# Patient Record
Sex: FEMALE | Race: ASIAN | NOT HISPANIC OR LATINO
[De-identification: names, ages, dates, MRNs, and addresses within clinical notes are randomized per-mention and may not be internally consistent; named-entity substitution may affect disease eponyms.]

---

## 2017-05-26 ENCOUNTER — TRANSCRIPTION ENCOUNTER (OUTPATIENT)
Age: 37
End: 2017-05-26

## 2017-05-26 ENCOUNTER — INPATIENT (INPATIENT)
Facility: HOSPITAL | Age: 37
LOS: 0 days | Discharge: HOME | DRG: 547 | End: 2017-05-26
Attending: INTERNAL MEDICINE | Admitting: INTERNAL MEDICINE
Payer: COMMERCIAL

## 2017-05-26 VITALS
DIASTOLIC BLOOD PRESSURE: 74 MMHG | HEART RATE: 70 BPM | RESPIRATION RATE: 18 BRPM | OXYGEN SATURATION: 100 % | TEMPERATURE: 208 F | SYSTOLIC BLOOD PRESSURE: 108 MMHG

## 2017-05-26 VITALS
RESPIRATION RATE: 16 BRPM | TEMPERATURE: 98 F | HEART RATE: 63 BPM | OXYGEN SATURATION: 99 % | DIASTOLIC BLOOD PRESSURE: 63 MMHG | SYSTOLIC BLOOD PRESSURE: 95 MMHG

## 2017-05-26 DIAGNOSIS — R11.2 NAUSEA WITH VOMITING, UNSPECIFIED: ICD-10-CM

## 2017-05-26 LAB
ALBUMIN SERPL ELPH-MCNC: 3.7 G/DL — SIGNIFICANT CHANGE UP (ref 3.3–5)
ALP SERPL-CCNC: 55 U/L — SIGNIFICANT CHANGE UP (ref 40–120)
ALT FLD-CCNC: 11 U/L RC — SIGNIFICANT CHANGE UP (ref 10–45)
ANION GAP SERPL CALC-SCNC: 15 MMOL/L — SIGNIFICANT CHANGE UP (ref 5–17)
APPEARANCE UR: CLEAR — SIGNIFICANT CHANGE UP
APTT BLD: 30.9 SEC — SIGNIFICANT CHANGE UP (ref 27.5–37.4)
AST SERPL-CCNC: 23 U/L — SIGNIFICANT CHANGE UP (ref 10–40)
BASOPHILS # BLD AUTO: 0 K/UL — SIGNIFICANT CHANGE UP (ref 0–0.2)
BASOPHILS NFR BLD AUTO: 0 % — SIGNIFICANT CHANGE UP (ref 0–2)
BILIRUB SERPL-MCNC: 0.3 MG/DL — SIGNIFICANT CHANGE UP (ref 0.2–1.2)
BILIRUB UR-MCNC: NEGATIVE — SIGNIFICANT CHANGE UP
BUN SERPL-MCNC: 9 MG/DL — SIGNIFICANT CHANGE UP (ref 7–23)
CALCIUM SERPL-MCNC: 8.3 MG/DL — LOW (ref 8.4–10.5)
CHLORIDE SERPL-SCNC: 107 MMOL/L — SIGNIFICANT CHANGE UP (ref 96–108)
CO2 SERPL-SCNC: 23 MMOL/L — SIGNIFICANT CHANGE UP (ref 22–31)
COLOR SPEC: YELLOW — SIGNIFICANT CHANGE UP
CREAT SERPL-MCNC: 0.62 MG/DL — SIGNIFICANT CHANGE UP (ref 0.5–1.3)
DIFF PNL FLD: NEGATIVE — SIGNIFICANT CHANGE UP
EOSINOPHIL # BLD AUTO: 0 K/UL — SIGNIFICANT CHANGE UP (ref 0–0.5)
EOSINOPHIL NFR BLD AUTO: 0.3 % — SIGNIFICANT CHANGE UP (ref 0–6)
EPI CELLS # UR: SIGNIFICANT CHANGE UP /HPF
GAS PNL BLDV: SIGNIFICANT CHANGE UP
GLUCOSE SERPL-MCNC: 88 MG/DL — SIGNIFICANT CHANGE UP (ref 70–99)
GLUCOSE UR QL: NEGATIVE — SIGNIFICANT CHANGE UP
HCT VFR BLD CALC: 33.7 % — LOW (ref 34.5–45)
HGB BLD-MCNC: 11.3 G/DL — LOW (ref 11.5–15.5)
INR BLD: 1.09 RATIO — SIGNIFICANT CHANGE UP (ref 0.88–1.16)
KETONES UR-MCNC: ABNORMAL
LEUKOCYTE ESTERASE UR-ACNC: NEGATIVE — SIGNIFICANT CHANGE UP
LIDOCAIN IGE QN: 41 U/L — SIGNIFICANT CHANGE UP (ref 7–60)
LYMPHOCYTES # BLD AUTO: 1 K/UL — SIGNIFICANT CHANGE UP (ref 1–3.3)
LYMPHOCYTES # BLD AUTO: 17.1 % — SIGNIFICANT CHANGE UP (ref 13–44)
MAGNESIUM SERPL-MCNC: 2 MG/DL — SIGNIFICANT CHANGE UP (ref 1.6–2.6)
MCHC RBC-ENTMCNC: 30.2 PG — SIGNIFICANT CHANGE UP (ref 27–34)
MCHC RBC-ENTMCNC: 33.6 GM/DL — SIGNIFICANT CHANGE UP (ref 32–36)
MCV RBC AUTO: 89.8 FL — SIGNIFICANT CHANGE UP (ref 80–100)
MONOCYTES # BLD AUTO: 0.2 K/UL — SIGNIFICANT CHANGE UP (ref 0–0.9)
MONOCYTES NFR BLD AUTO: 4 % — SIGNIFICANT CHANGE UP (ref 2–14)
NEUTROPHILS # BLD AUTO: 4.5 K/UL — SIGNIFICANT CHANGE UP (ref 1.8–7.4)
NEUTROPHILS NFR BLD AUTO: 78.7 % — HIGH (ref 43–77)
NITRITE UR-MCNC: NEGATIVE — SIGNIFICANT CHANGE UP
PH UR: 8.5 — HIGH (ref 5–8)
PHOSPHATE SERPL-MCNC: 1.5 MG/DL — LOW (ref 2.5–4.5)
PLATELET # BLD AUTO: 168 K/UL — SIGNIFICANT CHANGE UP (ref 150–400)
POTASSIUM SERPL-MCNC: 3.3 MMOL/L — LOW (ref 3.5–5.3)
POTASSIUM SERPL-SCNC: 3.3 MMOL/L — LOW (ref 3.5–5.3)
PROT SERPL-MCNC: 6.8 G/DL — SIGNIFICANT CHANGE UP (ref 6–8.3)
PROT UR-MCNC: >600 MG/DL
PROTHROM AB SERPL-ACNC: 11.8 SEC — SIGNIFICANT CHANGE UP (ref 9.8–12.7)
RBC # BLD: 3.76 M/UL — LOW (ref 3.8–5.2)
RBC # FLD: 11.1 % — SIGNIFICANT CHANGE UP (ref 10.3–14.5)
RBC CASTS # UR COMP ASSIST: SIGNIFICANT CHANGE UP /HPF (ref 0–2)
SODIUM SERPL-SCNC: 145 MMOL/L — SIGNIFICANT CHANGE UP (ref 135–145)
SP GR SPEC: 1.02 — SIGNIFICANT CHANGE UP (ref 1.01–1.02)
UROBILINOGEN FLD QL: NEGATIVE — SIGNIFICANT CHANGE UP
WBC # BLD: 5.7 K/UL — SIGNIFICANT CHANGE UP (ref 3.8–10.5)
WBC # FLD AUTO: 5.7 K/UL — SIGNIFICANT CHANGE UP (ref 3.8–10.5)
WBC UR QL: SIGNIFICANT CHANGE UP /HPF (ref 0–5)

## 2017-05-26 PROCEDURE — 99285 EMERGENCY DEPT VISIT HI MDM: CPT | Mod: 25

## 2017-05-26 PROCEDURE — 82947 ASSAY GLUCOSE BLOOD QUANT: CPT

## 2017-05-26 PROCEDURE — 83605 ASSAY OF LACTIC ACID: CPT

## 2017-05-26 PROCEDURE — 85610 PROTHROMBIN TIME: CPT

## 2017-05-26 PROCEDURE — 84132 ASSAY OF SERUM POTASSIUM: CPT

## 2017-05-26 PROCEDURE — 84100 ASSAY OF PHOSPHORUS: CPT

## 2017-05-26 PROCEDURE — 80053 COMPREHEN METABOLIC PANEL: CPT

## 2017-05-26 PROCEDURE — 85014 HEMATOCRIT: CPT

## 2017-05-26 PROCEDURE — 84295 ASSAY OF SERUM SODIUM: CPT

## 2017-05-26 PROCEDURE — 71010: CPT | Mod: 26

## 2017-05-26 PROCEDURE — G0378: CPT

## 2017-05-26 PROCEDURE — 85027 COMPLETE CBC AUTOMATED: CPT

## 2017-05-26 PROCEDURE — 83735 ASSAY OF MAGNESIUM: CPT

## 2017-05-26 PROCEDURE — 96374 THER/PROPH/DIAG INJ IV PUSH: CPT

## 2017-05-26 PROCEDURE — 74177 CT ABD & PELVIS W/CONTRAST: CPT

## 2017-05-26 PROCEDURE — 74177 CT ABD & PELVIS W/CONTRAST: CPT | Mod: 26

## 2017-05-26 PROCEDURE — 85730 THROMBOPLASTIN TIME PARTIAL: CPT

## 2017-05-26 PROCEDURE — 82435 ASSAY OF BLOOD CHLORIDE: CPT

## 2017-05-26 PROCEDURE — 96375 TX/PRO/DX INJ NEW DRUG ADDON: CPT

## 2017-05-26 PROCEDURE — 82803 BLOOD GASES ANY COMBINATION: CPT

## 2017-05-26 PROCEDURE — 82330 ASSAY OF CALCIUM: CPT

## 2017-05-26 PROCEDURE — 83690 ASSAY OF LIPASE: CPT

## 2017-05-26 PROCEDURE — 93005 ELECTROCARDIOGRAM TRACING: CPT

## 2017-05-26 PROCEDURE — 71045 X-RAY EXAM CHEST 1 VIEW: CPT

## 2017-05-26 PROCEDURE — 81001 URINALYSIS AUTO W/SCOPE: CPT

## 2017-05-26 RX ORDER — LORATADINE 10 MG/1
10 TABLET ORAL DAILY
Qty: 0 | Refills: 0 | Status: DISCONTINUED | OUTPATIENT
Start: 2017-05-26 | End: 2017-05-26

## 2017-05-26 RX ORDER — ONDANSETRON 8 MG/1
4 TABLET, FILM COATED ORAL ONCE
Qty: 0 | Refills: 0 | Status: COMPLETED | OUTPATIENT
Start: 2017-05-26 | End: 2017-05-26

## 2017-05-26 RX ORDER — HYDROMORPHONE HYDROCHLORIDE 2 MG/ML
1 INJECTION INTRAMUSCULAR; INTRAVENOUS; SUBCUTANEOUS ONCE
Qty: 0 | Refills: 0 | Status: DISCONTINUED | OUTPATIENT
Start: 2017-05-26 | End: 2017-05-26

## 2017-05-26 RX ORDER — FLUTICASONE PROPIONATE 50 MCG
1 SPRAY, SUSPENSION NASAL DAILY
Qty: 0 | Refills: 0 | Status: DISCONTINUED | OUTPATIENT
Start: 2017-05-26 | End: 2017-05-26

## 2017-05-26 RX ORDER — SODIUM CHLORIDE 9 MG/ML
500 INJECTION INTRAMUSCULAR; INTRAVENOUS; SUBCUTANEOUS ONCE
Qty: 0 | Refills: 0 | Status: DISCONTINUED | OUTPATIENT
Start: 2017-05-26 | End: 2017-05-26

## 2017-05-26 RX ORDER — SODIUM CHLORIDE 9 MG/ML
1000 INJECTION INTRAMUSCULAR; INTRAVENOUS; SUBCUTANEOUS ONCE
Qty: 0 | Refills: 0 | Status: COMPLETED | OUTPATIENT
Start: 2017-05-26 | End: 2017-05-26

## 2017-05-26 RX ADMIN — ONDANSETRON 4 MILLIGRAM(S): 8 TABLET, FILM COATED ORAL at 09:12

## 2017-05-26 RX ADMIN — HYDROMORPHONE HYDROCHLORIDE 1 MILLIGRAM(S): 2 INJECTION INTRAMUSCULAR; INTRAVENOUS; SUBCUTANEOUS at 12:42

## 2017-05-26 RX ADMIN — Medication 100 MILLIGRAM(S): at 12:45

## 2017-05-26 RX ADMIN — HYDROMORPHONE HYDROCHLORIDE 1 MILLIGRAM(S): 2 INJECTION INTRAMUSCULAR; INTRAVENOUS; SUBCUTANEOUS at 10:39

## 2017-05-26 RX ADMIN — SODIUM CHLORIDE 2000 MILLILITER(S): 9 INJECTION INTRAMUSCULAR; INTRAVENOUS; SUBCUTANEOUS at 09:12

## 2017-05-26 NOTE — H&P ADULT. - FAMILY HISTORY
<<-----Click on this checkbox to enter Family History Family history of hypertension, .     Father  Still living? Unknown  Family history of hypercholesterolemia, Age at diagnosis: Age Unknown

## 2017-05-26 NOTE — ED PROVIDER NOTE - MEDICAL DECISION MAKING DETAILS
36F with past medical history and hpi as noted presents with n/v and epigastric pain.  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  WIll obtain labs, give IVF and zofran, CT abdomen/pelvis, rheum consult, admit.

## 2017-05-26 NOTE — DISCHARGE NOTE ADULT - PLAN OF CARE
improvement and resolution of your symptoms You have a scheduled appointment on Friday 6/2/17. Please keep and follow up with your Rheumatologist/ Primary Doctor on Friday. You may return to the emergency department for any worsening.   HOME CARE INSTRUCTIONS  Get plenty of rest.  Drink enough water and fluids to keep your urine clear or pale yellow.  Eat a well-balanced diet.  Call your caregiver for follow-up as recommended.  SEEK IMMEDIATE MEDICAL CARE IF:  You develop chills.  You have an oral temperature above 101.1, not controlled by medicine.  You have extreme weakness, fainting, or dehydration.  You have repeated vomiting.  You develop severe belly (abdominal) pain or are passing bloody or tarry stools Continue prescription medications including tapered dose prednisone as prescribed and directed by your Rheumatologist. .

## 2017-05-26 NOTE — ED ADULT NURSE REASSESSMENT NOTE - NS ED NURSE REASSESS COMMENT FT1
rt arm IV infiltrated. Warm compress applied. New IV started lt wrist  with 20g NS lock sans problems.

## 2017-05-26 NOTE — DISCHARGE NOTE ADULT - CARE PLAN
Principal Discharge DX:	Enteritis  Goal:	improvement and resolution of your symptoms  Instructions for follow-up, activity and diet:	You have a scheduled appointment on Friday 6/2/17. Please keep and follow up with your Rheumatologist/ Primary Doctor on Friday. You may return to the emergency department for any worsening.   HOME CARE INSTRUCTIONS  Get plenty of rest.  Drink enough water and fluids to keep your urine clear or pale yellow.  Eat a well-balanced diet.  Call your caregiver for follow-up as recommended.  SEEK IMMEDIATE MEDICAL CARE IF:  You develop chills.  You have an oral temperature above 101.1, not controlled by medicine.  You have extreme weakness, fainting, or dehydration.  You have repeated vomiting.  You develop severe belly (abdominal) pain or are passing bloody or tarry stools  Secondary Diagnosis:	Other systemic lupus erythematosus with other organ involvement  Instructions for follow-up, activity and diet:	Continue prescription medications including tapered dose prednisone as prescribed and directed by your Rheumatologist. .

## 2017-05-26 NOTE — DISCHARGE NOTE ADULT - ADDITIONAL INSTRUCTIONS
1. Continue Prescription medications and tapered dose steroid until completed as directed by your rheumatologist.   2. Keep follow up appointment on 6/2/17 at your Rheumatologist   3. You may return for any worsening symptoms.

## 2017-05-26 NOTE — H&P ADULT. - HISTORY OF PRESENT ILLNESS
36 y/ofemale with PMH of lupus, prior lupus nephritis, mesenteric vasculitis p/w 4 episodes of nbnb nausea and vomiting for past 6 hours.  Thinks was set off by food poisoning.  Sharp, intermittent epigastric pain started after vomiting.  Pt states feels like the onset of prior mesenteric vasculitis.  Denies diarrhea, constipation, fever, chills, cp, sob, dysuria, hematuria, ha.  Taken prednisone 10mg qd for past two days for signs of lupus flare up (heart burn).  LMP: 1 week ago was carly l.    PMD: Avram Goldberg (rheum)    pt received iv steroids in ER per rheum recs and now feels better.  CT abd/pel done. results pending

## 2017-05-26 NOTE — ED PROVIDER NOTE - OBJECTIVE STATEMENT
36 y/ofemale with PMH of lupus, prior lupus nephritis, mesenteric vasculitis p/w 4 episodes of nbnb nausea and vomiting for past 6 hours.  Thinks was set off by food poisoning.  Sharp, intermittent epigastric pain started after vomiting.  Pt states feels like the onset of prior mesenteric vasculitis.  Denies diarrhea, constipation, fever, chills, cp, sob, dysuria, hematuria, ha.  Taken prednisone 10mg qd for past two days for signs of lupus flare up (heart burn).      PMD: Avram Goldberg (rheum) 36 y/ofemale with PMH of lupus, prior lupus nephritis, mesenteric vasculitis p/w 4 episodes of nbnb nausea and vomiting for past 6 hours.  Thinks was set off by food poisoning.  Sharp, intermittent epigastric pain started after vomiting.  Pt states feels like the onset of prior mesenteric vasculitis.  Denies diarrhea, constipation, fever, chills, cp, sob, dysuria, hematuria, ha.  Taken prednisone 10mg qd for past two days for signs of lupus flare up (heart burn).  LMP: 1 week ago was normal.    PMD: Avram Goldberg (rheum) 36 y/ofemale with PMH of lupus, prior lupus nephritis, mesenteric vasculitis p/w 4 episodes of nbnb nausea and vomiting for past 6 hours.  Thinks was set off by food poisoning.  Sharp, intermittent epigastric pain started after vomiting.  Pt states feels like the onset of prior mesenteric vasculitis.  Denies diarrhea, constipation, fever, chills, cp, sob, dysuria, hematuria, ha.  Taken prednisone 10mg qd for past two days for signs of lupus flare up (heart burn).  LMP: 1 week ago was carly l.    PMD: Avram Goldberg (rheum)

## 2017-05-26 NOTE — ED PROVIDER NOTE - PROGRESS NOTE DETAILS
Paged Dr. Goldberg to discuss patient. D/w Dr. Longoria, who previously admitted patient 2 yr ago to discuss case.  Will admit under Swati, and he said he would discuss with Dr. Daniels.

## 2017-05-26 NOTE — ED PROVIDER NOTE - CARE PLAN
Principal Discharge DX:	Intractable vomiting with nausea, unspecified vomiting type  Secondary Diagnosis:	SLE (systemic lupus erythematosus)

## 2017-05-26 NOTE — DISCHARGE NOTE ADULT - MEDICATION SUMMARY - MEDICATIONS TO CHANGE
I will SWITCH the dose or number of times a day I take the medications listed below when I get home from the hospital:  None
Patient informed/Explanation of wait

## 2017-05-26 NOTE — ED ADULT NURSE NOTE - PMH
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

## 2017-05-26 NOTE — ED PROVIDER NOTE - ATTENDING CONTRIBUTION TO CARE
Private Physician Avram Goldberg (rheum/pcp)  37 y/o female with PMH of lupus, prior lupus nephritis, mesenteric vasculitis, Last admit 2015 for same now comes to ed complains of NV, NBNB, onset few days ago with "heartburn" Now worsening symptoms since approx 1am, No fever chills. No habits,travel,cacner, dm. PE WDWN male awake alert speech fluent chest clear anterior & posterior, cv no rubs, gallops or murmurs, abd gen ttp no rebuond guarding. neuro no focal defects  Seymour Chong MD, Facep

## 2017-05-26 NOTE — ED ADULT NURSE NOTE - OBJECTIVE STATEMENT
Pt is a 37 yo  F who came to the Ed amb c/o sudden onset vomiting at 0100 today followed by abd pain. Pain is achy, in the epigastric area. Abd soft, tender epigastric area.

## 2017-05-26 NOTE — H&P ADULT. - ASSESSMENT
35 yo female h/o lupus vasculitis, a/w abd pain, nausea, vomiting.   --pt seen by rheum  --received iv steroids  --awaiting CT abd/pel  --symptoms resolved  --ok to dc home as per rheum if CT neg with outpt f/u and oral steroid taper.  cont other home meds

## 2017-05-26 NOTE — DISCHARGE NOTE ADULT - PATIENT PORTAL LINK FT
“You can access the [facility] Patient Portal, offered by [facility], by registering with the following website: [email or web address]”

## 2017-05-26 NOTE — DISCHARGE NOTE ADULT - CARE PROVIDER_API CALL
Goldberg, Avram Z (MD), Internal Medicine; Rheumatology  [address]  [place] [address]  Phone: [phone]  Fax: [phone]

## 2017-05-26 NOTE — DISCHARGE NOTE ADULT - MEDICATION SUMMARY - MEDICATIONS TO TAKE
I will START or STAY ON the medications listed below when I get home from the hospital:    predniSONE 10 mg oral tablet  -- 30 tab(s) by mouth once a day, to be on a tapered dose as instructed and directed by your PMD/ Rheumatologist   -- Indication: For SLE (systemic lupus erythematosus)    ZyrTEC 10 mg oral tablet  -- 1 tab(s) by mouth once a day, As Needed  -- Indication: For Anti Allergy/ Histamine     Flonase 50 mcg/inh nasal spray  -- 1 spray(s) in each nostril once a day, As Needed  -- Indication: For Allergy     Calcium 600+D oral tablet  -- 1 tab(s) by mouth once a day  -- Indication: For Indigestion

## 2017-07-12 ENCOUNTER — OFFICE VISIT (OUTPATIENT)
Dept: OTOLARYNGOLOGY | Facility: CLINIC | Age: 37
End: 2017-07-12

## 2017-07-12 VITALS
TEMPERATURE: 98 F | BODY MASS INDEX: 17.75 KG/M2 | SYSTOLIC BLOOD PRESSURE: 98 MMHG | DIASTOLIC BLOOD PRESSURE: 58 MMHG | WEIGHT: 104 LBS | HEIGHT: 64 IN

## 2017-07-12 DIAGNOSIS — S02.2XXA CLOSED FRACTURE OF NASAL BONE, INITIAL ENCOUNTER: Primary | ICD-10-CM

## 2017-07-12 PROCEDURE — 99203 OFFICE O/P NEW LOW 30 MIN: CPT | Performed by: OTOLARYNGOLOGY

## 2017-07-12 PROCEDURE — 99212 OFFICE O/P EST SF 10 MIN: CPT | Performed by: OTOLARYNGOLOGY

## 2017-07-12 RX ORDER — AMOXICILLIN AND CLAVULANATE POTASSIUM 875; 125 MG/1; MG/1
1 TABLET, FILM COATED ORAL 2 TIMES DAILY
COMMUNITY

## 2017-07-12 RX ORDER — PREDNISONE 10 MG/1
10 TABLET ORAL DAILY
COMMUNITY

## 2017-07-12 NOTE — PATIENT INSTRUCTIONS
Nose Laceration with Fracture: Skin Glue  You have a cut and broken nose. The cut may bleed. The broken nose may cause pain, swelling, and nasal stuffiness. The nose may bleed or leak a clear fluid.  The break may be a minor crack or a major break with th ¨ Do not scratch, rub, or pick at the skin glue. Do not place tape directly over the glue. Do not apply liquids (such as peroxide), ointments, or creams to the wound while the glue is in place. · Most facial skin wounds heal without problems.  However, an · Signs of infection, including increasing swelling, pain, or redness around the wound, or pus draining from the wound  · Fever of 100.4°F (38ºC) or higher, or as directed by your healthcare provider  · Inability to breathe from both sides of the nose afte
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
## 2018-01-30 ENCOUNTER — APPOINTMENT (OUTPATIENT)
Dept: MAMMOGRAPHY | Facility: CLINIC | Age: 38
End: 2018-01-30
Payer: COMMERCIAL

## 2018-01-30 ENCOUNTER — APPOINTMENT (OUTPATIENT)
Dept: HUMAN REPRODUCTION | Facility: CLINIC | Age: 38
End: 2018-01-30
Payer: COMMERCIAL

## 2018-01-30 ENCOUNTER — APPOINTMENT (OUTPATIENT)
Dept: ULTRASOUND IMAGING | Facility: CLINIC | Age: 38
End: 2018-01-30
Payer: COMMERCIAL

## 2018-01-30 ENCOUNTER — OUTPATIENT (OUTPATIENT)
Dept: OUTPATIENT SERVICES | Facility: HOSPITAL | Age: 38
LOS: 1 days | End: 2018-01-30
Payer: COMMERCIAL

## 2018-01-30 DIAGNOSIS — Z00.8 ENCOUNTER FOR OTHER GENERAL EXAMINATION: ICD-10-CM

## 2018-01-30 PROCEDURE — G0279: CPT

## 2018-01-30 PROCEDURE — 77066 DX MAMMO INCL CAD BI: CPT | Mod: 26

## 2018-01-30 PROCEDURE — G0279: CPT | Mod: 26

## 2018-01-30 PROCEDURE — 76642 ULTRASOUND BREAST LIMITED: CPT | Mod: 26,LT

## 2018-01-30 PROCEDURE — 77066 DX MAMMO INCL CAD BI: CPT

## 2018-01-30 PROCEDURE — 76830 TRANSVAGINAL US NON-OB: CPT

## 2018-01-30 PROCEDURE — 36415 COLL VENOUS BLD VENIPUNCTURE: CPT

## 2018-01-30 PROCEDURE — 99205 OFFICE O/P NEW HI 60 MIN: CPT

## 2018-01-30 PROCEDURE — 76642 ULTRASOUND BREAST LIMITED: CPT

## 2018-02-05 ENCOUNTER — APPOINTMENT (OUTPATIENT)
Dept: MAMMOGRAPHY | Facility: IMAGING CENTER | Age: 38
End: 2018-02-05
Payer: COMMERCIAL

## 2018-02-05 ENCOUNTER — OUTPATIENT (OUTPATIENT)
Dept: OUTPATIENT SERVICES | Facility: HOSPITAL | Age: 38
LOS: 1 days | End: 2018-02-05
Payer: COMMERCIAL

## 2018-02-05 ENCOUNTER — RESULT REVIEW (OUTPATIENT)
Age: 38
End: 2018-02-05

## 2018-02-05 DIAGNOSIS — Z00.00 ENCOUNTER FOR GENERAL ADULT MEDICAL EXAMINATION WITHOUT ABNORMAL FINDINGS: ICD-10-CM

## 2018-02-05 PROCEDURE — 77065 DX MAMMO INCL CAD UNI: CPT | Mod: 26,LT

## 2018-02-05 PROCEDURE — A4648: CPT

## 2018-02-05 PROCEDURE — 19081 BX BREAST 1ST LESION STRTCTC: CPT

## 2018-02-05 PROCEDURE — 19081 BX BREAST 1ST LESION STRTCTC: CPT | Mod: LT

## 2018-02-05 PROCEDURE — 77065 DX MAMMO INCL CAD UNI: CPT

## 2018-03-04 ENCOUNTER — TRANSCRIPTION ENCOUNTER (OUTPATIENT)
Age: 38
End: 2018-03-04

## 2018-03-26 ENCOUNTER — APPOINTMENT (OUTPATIENT)
Dept: HUMAN REPRODUCTION | Facility: CLINIC | Age: 38
End: 2018-03-26

## 2018-04-10 ENCOUNTER — APPOINTMENT (OUTPATIENT)
Dept: HUMAN REPRODUCTION | Facility: CLINIC | Age: 38
End: 2018-04-10

## 2018-05-02 ENCOUNTER — EMERGENCY (EMERGENCY)
Facility: HOSPITAL | Age: 38
LOS: 1 days | Discharge: HOME | End: 2018-05-02
Attending: EMERGENCY MEDICINE
Payer: COMMERCIAL

## 2018-05-02 VITALS
SYSTOLIC BLOOD PRESSURE: 118 MMHG | WEIGHT: 104.06 LBS | DIASTOLIC BLOOD PRESSURE: 83 MMHG | RESPIRATION RATE: 16 BRPM | TEMPERATURE: 98 F | HEART RATE: 82 BPM | OXYGEN SATURATION: 99 %

## 2018-05-02 LAB
ALBUMIN SERPL ELPH-MCNC: 3.2 G/DL — LOW (ref 3.3–5)
ALP SERPL-CCNC: 39 U/L — LOW (ref 40–120)
ALT FLD-CCNC: 12 U/L — SIGNIFICANT CHANGE UP (ref 10–45)
ANION GAP SERPL CALC-SCNC: 14 MMOL/L — SIGNIFICANT CHANGE UP (ref 5–17)
APTT BLD: 29.7 SEC — SIGNIFICANT CHANGE UP (ref 27.5–37.4)
AST SERPL-CCNC: 31 U/L — SIGNIFICANT CHANGE UP (ref 10–40)
BASE EXCESS BLDV CALC-SCNC: -0.1 MMOL/L — SIGNIFICANT CHANGE UP (ref -2–2)
BASOPHILS # BLD AUTO: 0 K/UL — SIGNIFICANT CHANGE UP (ref 0–0.2)
BASOPHILS NFR BLD AUTO: 0.3 % — SIGNIFICANT CHANGE UP (ref 0–2)
BILIRUB SERPL-MCNC: 0.4 MG/DL — SIGNIFICANT CHANGE UP (ref 0.2–1.2)
BUN SERPL-MCNC: 12 MG/DL — SIGNIFICANT CHANGE UP (ref 7–23)
CA-I SERPL-SCNC: 1.06 MMOL/L — LOW (ref 1.12–1.3)
CALCIUM SERPL-MCNC: 8.3 MG/DL — LOW (ref 8.4–10.5)
CHLORIDE BLDV-SCNC: 107 MMOL/L — SIGNIFICANT CHANGE UP (ref 96–108)
CHLORIDE SERPL-SCNC: 105 MMOL/L — SIGNIFICANT CHANGE UP (ref 96–108)
CO2 BLDV-SCNC: 24 MMOL/L — SIGNIFICANT CHANGE UP (ref 22–30)
CO2 SERPL-SCNC: 19 MMOL/L — LOW (ref 22–31)
CREAT SERPL-MCNC: 0.75 MG/DL — SIGNIFICANT CHANGE UP (ref 0.5–1.3)
EOSINOPHIL # BLD AUTO: 0 K/UL — SIGNIFICANT CHANGE UP (ref 0–0.5)
EOSINOPHIL NFR BLD AUTO: 0.1 % — SIGNIFICANT CHANGE UP (ref 0–6)
GAS PNL BLDV: 136 MMOL/L — SIGNIFICANT CHANGE UP (ref 136–145)
GAS PNL BLDV: SIGNIFICANT CHANGE UP
GLUCOSE BLDV-MCNC: 94 MG/DL — SIGNIFICANT CHANGE UP (ref 70–99)
GLUCOSE SERPL-MCNC: 95 MG/DL — SIGNIFICANT CHANGE UP (ref 70–99)
HCG SERPL QL: NEGATIVE — SIGNIFICANT CHANGE UP
HCO3 BLDV-SCNC: 23 MMOL/L — SIGNIFICANT CHANGE UP (ref 21–29)
HCT VFR BLD CALC: 39.7 % — SIGNIFICANT CHANGE UP (ref 34.5–45)
HCT VFR BLDA CALC: 36 % — LOW (ref 39–50)
HGB BLD CALC-MCNC: 11.6 G/DL — SIGNIFICANT CHANGE UP (ref 11.5–15.5)
HGB BLD-MCNC: 13.4 G/DL — SIGNIFICANT CHANGE UP (ref 11.5–15.5)
INR BLD: 1.07 RATIO — SIGNIFICANT CHANGE UP (ref 0.88–1.16)
LACTATE BLDV-MCNC: 1.9 MMOL/L — SIGNIFICANT CHANGE UP (ref 0.7–2)
LIDOCAIN IGE QN: 56 U/L — SIGNIFICANT CHANGE UP (ref 7–60)
LYMPHOCYTES # BLD AUTO: 1.6 K/UL — SIGNIFICANT CHANGE UP (ref 1–3.3)
LYMPHOCYTES # BLD AUTO: 15.6 % — SIGNIFICANT CHANGE UP (ref 13–44)
MCHC RBC-ENTMCNC: 30.1 PG — SIGNIFICANT CHANGE UP (ref 27–34)
MCHC RBC-ENTMCNC: 33.7 GM/DL — SIGNIFICANT CHANGE UP (ref 32–36)
MCV RBC AUTO: 89.3 FL — SIGNIFICANT CHANGE UP (ref 80–100)
MONOCYTES # BLD AUTO: 0.4 K/UL — SIGNIFICANT CHANGE UP (ref 0–0.9)
MONOCYTES NFR BLD AUTO: 3.4 % — SIGNIFICANT CHANGE UP (ref 2–14)
NEUTROPHILS # BLD AUTO: 8.3 K/UL — HIGH (ref 1.8–7.4)
NEUTROPHILS NFR BLD AUTO: 80.6 % — HIGH (ref 43–77)
PCO2 BLDV: 34 MMHG — LOW (ref 35–50)
PH BLDV: 7.45 — SIGNIFICANT CHANGE UP (ref 7.35–7.45)
PLATELET # BLD AUTO: 182 K/UL — SIGNIFICANT CHANGE UP (ref 150–400)
PO2 BLDV: 32 MMHG — SIGNIFICANT CHANGE UP (ref 25–45)
POTASSIUM BLDV-SCNC: 4.2 MMOL/L — SIGNIFICANT CHANGE UP (ref 3.5–5)
POTASSIUM SERPL-MCNC: 4.2 MMOL/L — SIGNIFICANT CHANGE UP (ref 3.5–5.3)
POTASSIUM SERPL-SCNC: 4.2 MMOL/L — SIGNIFICANT CHANGE UP (ref 3.5–5.3)
PROT SERPL-MCNC: 6.6 G/DL — SIGNIFICANT CHANGE UP (ref 6–8.3)
PROTHROM AB SERPL-ACNC: 11.7 SEC — SIGNIFICANT CHANGE UP (ref 9.8–12.7)
RBC # BLD: 4.44 M/UL — SIGNIFICANT CHANGE UP (ref 3.8–5.2)
RBC # FLD: 10.7 % — SIGNIFICANT CHANGE UP (ref 10.3–14.5)
SAO2 % BLDV: 59 % — LOW (ref 67–88)
SODIUM SERPL-SCNC: 138 MMOL/L — SIGNIFICANT CHANGE UP (ref 135–145)
WBC # BLD: 10.3 K/UL — SIGNIFICANT CHANGE UP (ref 3.8–10.5)
WBC # FLD AUTO: 10.3 K/UL — SIGNIFICANT CHANGE UP (ref 3.8–10.5)

## 2018-05-02 PROCEDURE — 74177 CT ABD & PELVIS W/CONTRAST: CPT | Mod: 26

## 2018-05-02 PROCEDURE — 99285 EMERGENCY DEPT VISIT HI MDM: CPT

## 2018-05-02 RX ORDER — SODIUM CHLORIDE 9 MG/ML
1000 INJECTION INTRAMUSCULAR; INTRAVENOUS; SUBCUTANEOUS ONCE
Qty: 0 | Refills: 0 | Status: COMPLETED | OUTPATIENT
Start: 2018-05-02 | End: 2018-05-02

## 2018-05-02 RX ORDER — HYDROMORPHONE HYDROCHLORIDE 2 MG/ML
1 INJECTION INTRAMUSCULAR; INTRAVENOUS; SUBCUTANEOUS ONCE
Qty: 0 | Refills: 0 | Status: DISCONTINUED | OUTPATIENT
Start: 2018-05-02 | End: 2018-05-02

## 2018-05-02 RX ORDER — ONDANSETRON 8 MG/1
4 TABLET, FILM COATED ORAL ONCE
Qty: 0 | Refills: 0 | Status: COMPLETED | OUTPATIENT
Start: 2018-05-02 | End: 2018-05-02

## 2018-05-02 RX ADMIN — HYDROMORPHONE HYDROCHLORIDE 1 MILLIGRAM(S): 2 INJECTION INTRAMUSCULAR; INTRAVENOUS; SUBCUTANEOUS at 22:00

## 2018-05-02 RX ADMIN — ONDANSETRON 4 MILLIGRAM(S): 8 TABLET, FILM COATED ORAL at 21:45

## 2018-05-02 RX ADMIN — HYDROMORPHONE HYDROCHLORIDE 1 MILLIGRAM(S): 2 INJECTION INTRAMUSCULAR; INTRAVENOUS; SUBCUTANEOUS at 21:44

## 2018-05-02 RX ADMIN — Medication 125 MILLIGRAM(S): at 21:46

## 2018-05-02 RX ADMIN — SODIUM CHLORIDE 2000 MILLILITER(S): 9 INJECTION INTRAMUSCULAR; INTRAVENOUS; SUBCUTANEOUS at 21:45

## 2018-05-02 RX ADMIN — HYDROMORPHONE HYDROCHLORIDE 1 MILLIGRAM(S): 2 INJECTION INTRAMUSCULAR; INTRAVENOUS; SUBCUTANEOUS at 23:57

## 2018-05-02 NOTE — ED PROVIDER NOTE - OBJECTIVE STATEMENT
36 y/o female with PMH of lupus, prior lupus nephritis, mesenteric vasculitis presents to ED c/o 38 y/o female with PMH of lupus, prior lupus nephritis, mesenteric vasculitis presents to ED c/o severe 10/10 upper abdominal pain which started at 3pm and became unbearable at 5pm. Patient states she has had similar symptoms in the past w/ flare ups of her mesenteric vasculitis. Patient had onset of reflux yesterday and gas, took additional doses of steroids today and yesterday without relieve (currently on 10mg prednisone daily). She reports multiple episodes of nb/nb vomiting today. Last BM today regular. She denies fevers, chills, sob, urinary symptoms. Took tylenol this afternoon w/out relief      Rheum: Avram Goldberg

## 2018-05-02 NOTE — ED ADULT NURSE NOTE - OBJECTIVE STATEMENT
37F, pt states here for Lupus flare-up, states last flare up 2 years ago. C/o nasuea and upper abd pain, denies fever/chest pain/SOB/dizzy. Pt in intense pain 10/10. 22Ga to L hand, pt is difficult IV stick. Family at bedside. VSS. Pt A&Ox3, calm/cooperative.

## 2018-05-02 NOTE — ED PROVIDER NOTE - PHYSICAL EXAMINATION
CONSTITUTIONAL: Patient is awake, alert and oriented x 3. Patient is crying in pain holding her abdomen   HEAD: NCAT, EYES: PERRL b/l, EOMI,   ENT:Airway patent, Nasal mucosa clear. Mouth with normal mucosa. Throat has no vesicles, no oropharyngeal exudates and uvula is midline.  NECK: supple,   LUNGS: CTA B/L,  HEART: RRR.+S1S2 no murmurs,   ABDOMEN: Non-distended, diffuse ttp greatest in the epigastric area w/ guarding   EXTREMITY: no edema or calf tenderness b/l, FROM upper and lower ext b/l  SKIN: with no rash or lesions.   NEURO: No focal deficits

## 2018-05-02 NOTE — ED PROVIDER NOTE - FAMILY HISTORY
Family history of hypertension, .     Father  Still living? Unknown  Family history of hypercholesterolemia, Age at diagnosis: Age Unknown

## 2018-05-02 NOTE — ED PROVIDER NOTE - MEDICAL DECISION MAKING DETAILS
Pt with SLE has moderately severe crampy abdominal pain with n/v no fever no blood in stool no hypercoagulation abdomen soft diffuse tenderness BS+ no hernia heart and lungs wnl iv hydration anti emetics and analgesia administered and labs andCT abdomen reviewed enteritis to be treated outpatient --Andrei

## 2018-05-02 NOTE — ED PROVIDER NOTE - ATTENDING CONTRIBUTION TO CARE
I have seen and evaluated this patient with the [place] practice clinician.   I agree with the findings  unless other wise stated. I have amended notes where needed.  After my face to face bedside evaluation, I am noting: Pt with SLE has moderately severe crampy abdominal pain with n/v no fever no blood in stool no hypercoagulation abdomen soft diffuse tenderness BS+ no hernia heart and lungs wnl iv hydration anti emetics and analgesia administered and labs andCT abdomen reviewed enteritis to be treated outpatient --Andrei

## 2018-05-02 NOTE — ED PROVIDER NOTE - PROGRESS NOTE DETAILS
Viviane Jackson M.D: pt signed out to me pending ct. ct showing enteritis c/w mesenteric vasculitis. pt feels better wants to go home. will dc.

## 2018-05-03 ENCOUNTER — APPOINTMENT (OUTPATIENT)
Dept: PLASTIC SURGERY | Facility: CLINIC | Age: 38
End: 2018-05-03

## 2018-05-03 VITALS
HEART RATE: 75 BPM | RESPIRATION RATE: 15 BRPM | DIASTOLIC BLOOD PRESSURE: 59 MMHG | OXYGEN SATURATION: 97 % | SYSTOLIC BLOOD PRESSURE: 97 MMHG

## 2018-05-03 LAB
APPEARANCE UR: CLEAR — SIGNIFICANT CHANGE UP
BILIRUB UR-MCNC: NEGATIVE — SIGNIFICANT CHANGE UP
COLOR SPEC: SIGNIFICANT CHANGE UP
COMMENT - URINE: SIGNIFICANT CHANGE UP
DIFF PNL FLD: NEGATIVE — SIGNIFICANT CHANGE UP
EPI CELLS # UR: SIGNIFICANT CHANGE UP /HPF
GLUCOSE UR QL: NEGATIVE — SIGNIFICANT CHANGE UP
KETONES UR-MCNC: ABNORMAL
LEUKOCYTE ESTERASE UR-ACNC: NEGATIVE — SIGNIFICANT CHANGE UP
NITRITE UR-MCNC: NEGATIVE — SIGNIFICANT CHANGE UP
PH UR: 7.5 — SIGNIFICANT CHANGE UP (ref 5–8)
PROT UR-MCNC: 150 MG/DL
RBC CASTS # UR COMP ASSIST: SIGNIFICANT CHANGE UP /HPF (ref 0–2)
SP GR SPEC: >1.03 — HIGH (ref 1.01–1.02)
UROBILINOGEN FLD QL: NEGATIVE — SIGNIFICANT CHANGE UP
WBC UR QL: SIGNIFICANT CHANGE UP /HPF (ref 0–5)

## 2018-05-03 PROCEDURE — 85730 THROMBOPLASTIN TIME PARTIAL: CPT

## 2018-05-03 PROCEDURE — 85027 COMPLETE CBC AUTOMATED: CPT

## 2018-05-03 PROCEDURE — 83690 ASSAY OF LIPASE: CPT

## 2018-05-03 PROCEDURE — 82330 ASSAY OF CALCIUM: CPT

## 2018-05-03 PROCEDURE — 96376 TX/PRO/DX INJ SAME DRUG ADON: CPT

## 2018-05-03 PROCEDURE — 80053 COMPREHEN METABOLIC PANEL: CPT

## 2018-05-03 PROCEDURE — 85014 HEMATOCRIT: CPT

## 2018-05-03 PROCEDURE — 84295 ASSAY OF SERUM SODIUM: CPT

## 2018-05-03 PROCEDURE — 81001 URINALYSIS AUTO W/SCOPE: CPT

## 2018-05-03 PROCEDURE — 84703 CHORIONIC GONADOTROPIN ASSAY: CPT

## 2018-05-03 PROCEDURE — 96374 THER/PROPH/DIAG INJ IV PUSH: CPT | Mod: XU

## 2018-05-03 PROCEDURE — 83605 ASSAY OF LACTIC ACID: CPT

## 2018-05-03 PROCEDURE — 82435 ASSAY OF BLOOD CHLORIDE: CPT

## 2018-05-03 PROCEDURE — 87086 URINE CULTURE/COLONY COUNT: CPT

## 2018-05-03 PROCEDURE — 74177 CT ABD & PELVIS W/CONTRAST: CPT

## 2018-05-03 PROCEDURE — 87186 SC STD MICRODIL/AGAR DIL: CPT

## 2018-05-03 PROCEDURE — 82947 ASSAY GLUCOSE BLOOD QUANT: CPT

## 2018-05-03 PROCEDURE — 85610 PROTHROMBIN TIME: CPT

## 2018-05-03 PROCEDURE — 84132 ASSAY OF SERUM POTASSIUM: CPT

## 2018-05-03 PROCEDURE — 96375 TX/PRO/DX INJ NEW DRUG ADDON: CPT

## 2018-05-03 PROCEDURE — 99284 EMERGENCY DEPT VISIT MOD MDM: CPT | Mod: 25

## 2018-05-03 PROCEDURE — 82803 BLOOD GASES ANY COMBINATION: CPT

## 2018-05-03 RX ORDER — HYDROMORPHONE HYDROCHLORIDE 2 MG/ML
1 INJECTION INTRAMUSCULAR; INTRAVENOUS; SUBCUTANEOUS ONCE
Qty: 0 | Refills: 0 | Status: DISCONTINUED | OUTPATIENT
Start: 2018-05-03 | End: 2018-05-03

## 2018-05-03 RX ORDER — ONDANSETRON 8 MG/1
1 TABLET, FILM COATED ORAL
Qty: 12 | Refills: 0
Start: 2018-05-03 | End: 2018-05-05

## 2018-05-03 RX ORDER — ONDANSETRON 8 MG/1
4 TABLET, FILM COATED ORAL ONCE
Qty: 0 | Refills: 0 | Status: COMPLETED | OUTPATIENT
Start: 2018-05-03 | End: 2018-05-03

## 2018-05-03 RX ADMIN — HYDROMORPHONE HYDROCHLORIDE 1 MILLIGRAM(S): 2 INJECTION INTRAMUSCULAR; INTRAVENOUS; SUBCUTANEOUS at 03:12

## 2018-05-03 RX ADMIN — HYDROMORPHONE HYDROCHLORIDE 1 MILLIGRAM(S): 2 INJECTION INTRAMUSCULAR; INTRAVENOUS; SUBCUTANEOUS at 02:48

## 2018-05-03 RX ADMIN — HYDROMORPHONE HYDROCHLORIDE 1 MILLIGRAM(S): 2 INJECTION INTRAMUSCULAR; INTRAVENOUS; SUBCUTANEOUS at 00:13

## 2018-05-03 RX ADMIN — ONDANSETRON 4 MILLIGRAM(S): 8 TABLET, FILM COATED ORAL at 02:46

## 2018-05-05 ENCOUNTER — INPATIENT (INPATIENT)
Facility: HOSPITAL | Age: 38
LOS: 2 days | Discharge: HOME | DRG: 547 | End: 2018-05-08
Attending: INTERNAL MEDICINE | Admitting: INTERNAL MEDICINE
Payer: COMMERCIAL

## 2018-05-05 VITALS
RESPIRATION RATE: 17 BRPM | SYSTOLIC BLOOD PRESSURE: 124 MMHG | OXYGEN SATURATION: 95 % | WEIGHT: 104.06 LBS | HEART RATE: 88 BPM | DIASTOLIC BLOOD PRESSURE: 79 MMHG | HEIGHT: 65 IN

## 2018-05-05 DIAGNOSIS — K52.9 NONINFECTIVE GASTROENTERITIS AND COLITIS, UNSPECIFIED: ICD-10-CM

## 2018-05-05 DIAGNOSIS — M32.15 TUBULO-INTERSTITIAL NEPHROPATHY IN SYSTEMIC LUPUS ERYTHEMATOSUS: ICD-10-CM

## 2018-05-05 LAB
-  AMIKACIN: SIGNIFICANT CHANGE UP
-  AMOXICILLIN/CLAVULANIC ACID: SIGNIFICANT CHANGE UP
-  AMPICILLIN/SULBACTAM: SIGNIFICANT CHANGE UP
-  AMPICILLIN: SIGNIFICANT CHANGE UP
-  AZTREONAM: SIGNIFICANT CHANGE UP
-  CEFAZOLIN: SIGNIFICANT CHANGE UP
-  CEFEPIME: SIGNIFICANT CHANGE UP
-  CEFOXITIN: SIGNIFICANT CHANGE UP
-  CEFTRIAXONE: SIGNIFICANT CHANGE UP
-  CIPROFLOXACIN: SIGNIFICANT CHANGE UP
-  ERTAPENEM: SIGNIFICANT CHANGE UP
-  GENTAMICIN: SIGNIFICANT CHANGE UP
-  IMIPENEM: SIGNIFICANT CHANGE UP
-  LEVOFLOXACIN: SIGNIFICANT CHANGE UP
-  MEROPENEM: SIGNIFICANT CHANGE UP
-  NITROFURANTOIN: SIGNIFICANT CHANGE UP
-  PIPERACILLIN/TAZOBACTAM: SIGNIFICANT CHANGE UP
-  TIGECYCLINE: SIGNIFICANT CHANGE UP
-  TOBRAMYCIN: SIGNIFICANT CHANGE UP
-  TRIMETHOPRIM/SULFAMETHOXAZOLE: SIGNIFICANT CHANGE UP
ALBUMIN SERPL ELPH-MCNC: 3.1 G/DL — LOW (ref 3.3–5)
ALP SERPL-CCNC: 34 U/L — LOW (ref 40–120)
ALT FLD-CCNC: 10 U/L — SIGNIFICANT CHANGE UP (ref 10–45)
ANION GAP SERPL CALC-SCNC: 13 MMOL/L — SIGNIFICANT CHANGE UP (ref 5–17)
AST SERPL-CCNC: 27 U/L — SIGNIFICANT CHANGE UP (ref 10–40)
BASOPHILS # BLD AUTO: 0.1 K/UL — SIGNIFICANT CHANGE UP (ref 0–0.2)
BASOPHILS NFR BLD AUTO: 0.4 % — SIGNIFICANT CHANGE UP (ref 0–2)
BILIRUB SERPL-MCNC: 0.3 MG/DL — SIGNIFICANT CHANGE UP (ref 0.2–1.2)
BUN SERPL-MCNC: 11 MG/DL — SIGNIFICANT CHANGE UP (ref 7–23)
CALCIUM SERPL-MCNC: 7.8 MG/DL — LOW (ref 8.4–10.5)
CHLORIDE SERPL-SCNC: 109 MMOL/L — HIGH (ref 96–108)
CO2 SERPL-SCNC: 20 MMOL/L — LOW (ref 22–31)
CREAT SERPL-MCNC: 0.68 MG/DL — SIGNIFICANT CHANGE UP (ref 0.5–1.3)
CULTURE RESULTS: SIGNIFICANT CHANGE UP
EOSINOPHIL # BLD AUTO: 0 K/UL — SIGNIFICANT CHANGE UP (ref 0–0.5)
EOSINOPHIL NFR BLD AUTO: 0 % — SIGNIFICANT CHANGE UP (ref 0–6)
GAS PNL BLDV: SIGNIFICANT CHANGE UP
GLUCOSE SERPL-MCNC: 84 MG/DL — SIGNIFICANT CHANGE UP (ref 70–99)
HCT VFR BLD CALC: 36.2 % — SIGNIFICANT CHANGE UP (ref 34.5–45)
HGB BLD-MCNC: 12.3 G/DL — SIGNIFICANT CHANGE UP (ref 11.5–15.5)
LIDOCAIN IGE QN: 27 U/L — SIGNIFICANT CHANGE UP (ref 7–60)
LYMPHOCYTES # BLD AUTO: 2.5 K/UL — SIGNIFICANT CHANGE UP (ref 1–3.3)
LYMPHOCYTES # BLD AUTO: 21.7 % — SIGNIFICANT CHANGE UP (ref 13–44)
MAGNESIUM SERPL-MCNC: 2.5 MG/DL — SIGNIFICANT CHANGE UP (ref 1.6–2.6)
MCHC RBC-ENTMCNC: 30.7 PG — SIGNIFICANT CHANGE UP (ref 27–34)
MCHC RBC-ENTMCNC: 33.9 GM/DL — SIGNIFICANT CHANGE UP (ref 32–36)
MCV RBC AUTO: 90.7 FL — SIGNIFICANT CHANGE UP (ref 80–100)
METHOD TYPE: SIGNIFICANT CHANGE UP
MONOCYTES # BLD AUTO: 0.6 K/UL — SIGNIFICANT CHANGE UP (ref 0–0.9)
MONOCYTES NFR BLD AUTO: 5.2 % — SIGNIFICANT CHANGE UP (ref 2–14)
NEUTROPHILS # BLD AUTO: 8.5 K/UL — HIGH (ref 1.8–7.4)
NEUTROPHILS NFR BLD AUTO: 72.6 % — SIGNIFICANT CHANGE UP (ref 43–77)
ORGANISM # SPEC MICROSCOPIC CNT: SIGNIFICANT CHANGE UP
ORGANISM # SPEC MICROSCOPIC CNT: SIGNIFICANT CHANGE UP
PHOSPHATE SERPL-MCNC: 1.1 MG/DL — LOW (ref 2.5–4.5)
PLATELET # BLD AUTO: 163 K/UL — SIGNIFICANT CHANGE UP (ref 150–400)
POTASSIUM SERPL-MCNC: 3.8 MMOL/L — SIGNIFICANT CHANGE UP (ref 3.5–5.3)
POTASSIUM SERPL-SCNC: 3.8 MMOL/L — SIGNIFICANT CHANGE UP (ref 3.5–5.3)
PROT SERPL-MCNC: 5.8 G/DL — LOW (ref 6–8.3)
RBC # BLD: 3.99 M/UL — SIGNIFICANT CHANGE UP (ref 3.8–5.2)
RBC # FLD: 10.8 % — SIGNIFICANT CHANGE UP (ref 10.3–14.5)
SODIUM SERPL-SCNC: 142 MMOL/L — SIGNIFICANT CHANGE UP (ref 135–145)
SPECIMEN SOURCE: SIGNIFICANT CHANGE UP
WBC # BLD: 11.7 K/UL — HIGH (ref 3.8–10.5)
WBC # FLD AUTO: 11.7 K/UL — HIGH (ref 3.8–10.5)

## 2018-05-05 PROCEDURE — 99285 EMERGENCY DEPT VISIT HI MDM: CPT

## 2018-05-05 RX ORDER — SODIUM,POTASSIUM PHOSPHATES 278-250MG
1 POWDER IN PACKET (EA) ORAL ONCE
Qty: 0 | Refills: 0 | Status: DISCONTINUED | OUTPATIENT
Start: 2018-05-05 | End: 2018-05-05

## 2018-05-05 RX ORDER — LORATADINE 10 MG/1
10 TABLET ORAL DAILY
Qty: 0 | Refills: 0 | Status: DISCONTINUED | OUTPATIENT
Start: 2018-05-05 | End: 2018-05-08

## 2018-05-05 RX ORDER — ONDANSETRON 8 MG/1
4 TABLET, FILM COATED ORAL ONCE
Qty: 0 | Refills: 0 | Status: COMPLETED | OUTPATIENT
Start: 2018-05-05 | End: 2018-05-05

## 2018-05-05 RX ORDER — ENOXAPARIN SODIUM 100 MG/ML
40 INJECTION SUBCUTANEOUS EVERY 24 HOURS
Qty: 0 | Refills: 0 | Status: DISCONTINUED | OUTPATIENT
Start: 2018-05-05 | End: 2018-05-08

## 2018-05-05 RX ORDER — CEFTRIAXONE 500 MG/1
1 INJECTION, POWDER, FOR SOLUTION INTRAMUSCULAR; INTRAVENOUS ONCE
Qty: 0 | Refills: 0 | Status: COMPLETED | OUTPATIENT
Start: 2018-05-05 | End: 2018-05-05

## 2018-05-05 RX ORDER — SODIUM CHLORIDE 9 MG/ML
1000 INJECTION, SOLUTION INTRAVENOUS
Qty: 0 | Refills: 0 | Status: DISCONTINUED | OUTPATIENT
Start: 2018-05-05 | End: 2018-05-08

## 2018-05-05 RX ORDER — SODIUM CHLORIDE 9 MG/ML
1000 INJECTION INTRAMUSCULAR; INTRAVENOUS; SUBCUTANEOUS ONCE
Qty: 0 | Refills: 0 | Status: COMPLETED | OUTPATIENT
Start: 2018-05-05 | End: 2018-05-05

## 2018-05-05 RX ORDER — HYDROMORPHONE HYDROCHLORIDE 2 MG/ML
1 INJECTION INTRAMUSCULAR; INTRAVENOUS; SUBCUTANEOUS ONCE
Qty: 0 | Refills: 0 | Status: DISCONTINUED | OUTPATIENT
Start: 2018-05-05 | End: 2018-05-05

## 2018-05-05 RX ORDER — HYDROCORTISONE 20 MG
100 TABLET ORAL EVERY 8 HOURS
Qty: 0 | Refills: 0 | Status: DISCONTINUED | OUTPATIENT
Start: 2018-05-05 | End: 2018-05-06

## 2018-05-05 RX ORDER — FLUTICASONE PROPIONATE 50 MCG
1 SPRAY, SUSPENSION NASAL
Qty: 0 | Refills: 0 | COMMUNITY

## 2018-05-05 RX ORDER — POTASSIUM PHOSPHATE, MONOBASIC POTASSIUM PHOSPHATE, DIBASIC 236; 224 MG/ML; MG/ML
30 INJECTION, SOLUTION INTRAVENOUS ONCE
Qty: 0 | Refills: 0 | Status: COMPLETED | OUTPATIENT
Start: 2018-05-05 | End: 2018-05-05

## 2018-05-05 RX ORDER — HYDROMORPHONE HYDROCHLORIDE 2 MG/ML
1 INJECTION INTRAMUSCULAR; INTRAVENOUS; SUBCUTANEOUS EVERY 6 HOURS
Qty: 0 | Refills: 0 | Status: DISCONTINUED | OUTPATIENT
Start: 2018-05-05 | End: 2018-05-08

## 2018-05-05 RX ORDER — ONDANSETRON 8 MG/1
4 TABLET, FILM COATED ORAL
Qty: 0 | Refills: 0 | Status: DISCONTINUED | OUTPATIENT
Start: 2018-05-05 | End: 2018-05-08

## 2018-05-05 RX ORDER — POTASSIUM PHOSPHATE, MONOBASIC POTASSIUM PHOSPHATE, DIBASIC 236; 224 MG/ML; MG/ML
15 INJECTION, SOLUTION INTRAVENOUS ONCE
Qty: 0 | Refills: 0 | Status: DISCONTINUED | OUTPATIENT
Start: 2018-05-05 | End: 2018-05-05

## 2018-05-05 RX ORDER — ACETAMINOPHEN 500 MG
750 TABLET ORAL ONCE
Qty: 0 | Refills: 0 | Status: COMPLETED | OUTPATIENT
Start: 2018-05-05 | End: 2018-05-05

## 2018-05-05 RX ORDER — ONDANSETRON 8 MG/1
4 TABLET, FILM COATED ORAL EVERY 6 HOURS
Qty: 0 | Refills: 0 | Status: DISCONTINUED | OUTPATIENT
Start: 2018-05-05 | End: 2018-05-08

## 2018-05-05 RX ORDER — ONDANSETRON 8 MG/1
4 TABLET, FILM COATED ORAL ONCE
Qty: 0 | Refills: 0 | Status: DISCONTINUED | OUTPATIENT
Start: 2018-05-05 | End: 2018-05-05

## 2018-05-05 RX ADMIN — Medication 750 MILLIGRAM(S): at 12:48

## 2018-05-05 RX ADMIN — HYDROMORPHONE HYDROCHLORIDE 1 MILLIGRAM(S): 2 INJECTION INTRAMUSCULAR; INTRAVENOUS; SUBCUTANEOUS at 19:42

## 2018-05-05 RX ADMIN — ONDANSETRON 4 MILLIGRAM(S): 8 TABLET, FILM COATED ORAL at 11:30

## 2018-05-05 RX ADMIN — Medication 125 MILLIGRAM(S): at 14:24

## 2018-05-05 RX ADMIN — ONDANSETRON 4 MILLIGRAM(S): 8 TABLET, FILM COATED ORAL at 12:29

## 2018-05-05 RX ADMIN — HYDROMORPHONE HYDROCHLORIDE 1 MILLIGRAM(S): 2 INJECTION INTRAMUSCULAR; INTRAVENOUS; SUBCUTANEOUS at 12:48

## 2018-05-05 RX ADMIN — HYDROMORPHONE HYDROCHLORIDE 1 MILLIGRAM(S): 2 INJECTION INTRAMUSCULAR; INTRAVENOUS; SUBCUTANEOUS at 11:30

## 2018-05-05 RX ADMIN — SODIUM CHLORIDE 100 MILLILITER(S): 9 INJECTION, SOLUTION INTRAVENOUS at 19:46

## 2018-05-05 RX ADMIN — ONDANSETRON 4 MILLIGRAM(S): 8 TABLET, FILM COATED ORAL at 18:53

## 2018-05-05 RX ADMIN — SODIUM CHLORIDE 1000 MILLILITER(S): 9 INJECTION INTRAMUSCULAR; INTRAVENOUS; SUBCUTANEOUS at 11:31

## 2018-05-05 RX ADMIN — Medication 100 MILLIGRAM(S): at 21:21

## 2018-05-05 RX ADMIN — HYDROMORPHONE HYDROCHLORIDE 1 MILLIGRAM(S): 2 INJECTION INTRAMUSCULAR; INTRAVENOUS; SUBCUTANEOUS at 14:57

## 2018-05-05 RX ADMIN — CEFTRIAXONE 100 GRAM(S): 500 INJECTION, POWDER, FOR SOLUTION INTRAMUSCULAR; INTRAVENOUS at 12:45

## 2018-05-05 RX ADMIN — HYDROMORPHONE HYDROCHLORIDE 1 MILLIGRAM(S): 2 INJECTION INTRAMUSCULAR; INTRAVENOUS; SUBCUTANEOUS at 20:12

## 2018-05-05 RX ADMIN — Medication 300 MILLIGRAM(S): at 12:30

## 2018-05-05 RX ADMIN — ONDANSETRON 4 MILLIGRAM(S): 8 TABLET, FILM COATED ORAL at 19:42

## 2018-05-05 RX ADMIN — HYDROMORPHONE HYDROCHLORIDE 1 MILLIGRAM(S): 2 INJECTION INTRAMUSCULAR; INTRAVENOUS; SUBCUTANEOUS at 12:29

## 2018-05-05 RX ADMIN — POTASSIUM PHOSPHATE, MONOBASIC POTASSIUM PHOSPHATE, DIBASIC 83.33 MILLIMOLE(S): 236; 224 INJECTION, SOLUTION INTRAVENOUS at 14:36

## 2018-05-05 RX ADMIN — HYDROMORPHONE HYDROCHLORIDE 1 MILLIGRAM(S): 2 INJECTION INTRAMUSCULAR; INTRAVENOUS; SUBCUTANEOUS at 11:41

## 2018-05-05 NOTE — ED ADULT NURSE NOTE - OBJECTIVE STATEMENT
38 yo female with a PMH of Lupus and mesenteric vasculitis presents to ED with severe upper abdominal pain, nausea, vomiting, and diarrhea. Patient was seen in ED for same symptoms on 5/2/18, she was discharged home and felt well until this morning when pain returned. Pain in intermittent, severe, not associated with any aggravating or alleviating symptoms. She denies chest pain, dyspnea, hematemesis and hematochezia. Abdomen is soft, nondistended, tender to palpation in upper quadrants.

## 2018-05-05 NOTE — ED PROVIDER NOTE - GASTROINTESTINAL ABDOMINAL REBOUND
right lower quadrant/right upper quadrant/left upper quadrant/left lower quadrant right upper quadrant/suprapubic/right lower quadrant/left upper quadrant/left lower quadrant

## 2018-05-05 NOTE — ED PROVIDER NOTE - OBJECTIVE STATEMENT
Rheumatologist, Dr. Goldberg, Avram    38 yo F with a PMH SLE and SLE-renal disease who presents with abdominal pain. She states the pain is an achy pain that "comes and goes" but nothing precipitates the pain or makes it better. She has had nausea and NBNB emesis. Sh was in the ED 3 days ago when the pain started and had a CT that showed mesenteric vasculitis but was feeling better, and was discharged. She states the pain feels like prior pain from her lupus, and the last time she had this pain was 2 years ago. She has taken 125 Solumedrol injections daily for the past 2 days but they do not appear to be helping. She was on Benlysta (Belimumab) for 1 year but did not need it anymore. Last seen by rheumatologist yesterday. Rheumatologist, Dr. Goldberg, Avram    38 yo F with a PMH SLE and SLE-renal disease who presents with abdominal pain. She states the pain is an achy pain that "comes and goes" but nothing precipitates the pain or makes it better. She has had nausea and NBNB emesis. Sh was in the ED 3 days ago when the pain started and had a CT that showed mesenteric vasculitis but was feeling better, and was discharged. She states the pain feels like prior pain from her lupus, and the last time she had this pain was 2 years ago. She has taken 125 Solumedrol injections daily for the past 2 days but they do not appear to be helping. She was on Benlysta (Belimumab) for 1 year but did not need it anymore. Last seen by rheumatologist yesterday. No fevers or chills. Pt having regular menstrual periods. Rheumatologist, Dr. Goldberg, Avram    38 yo F with a PMH SLE and SLE-renal disease who presents with abdominal pain. She states the pain is an achy pain that "comes and goes" but nothing precipitates the pain or makes it better. She has had nausea and NBNB emesis. Sh was in the ED 3 days ago when the pain started and had a CT that showed mesenteric vasculitis but was feeling better, and was discharged. She states the pain feels like prior pain from her lupus, and the last time she had this pain was 2 years ago. She has taken 125 Solumedrol injections daily for the past 2 days but they do not appear to be helping. She was on Benlysta (Belimumab) for 1 year but did not need it anymore. Last seen by rheumatologist yesterday. No fevers or chills, no blood per rectum, and no hematuria or dysuria. Pt having regular menstrual periods. Rheumatologist/PCP, Dr. Goldberg, Avram    36 yo F with a PMH SLE and SLE-renal disease who presents with abdominal pain. She states the pain is an achy pain that "comes and goes" but nothing precipitates the pain or makes it better. She has had nausea and NBNB emesis. Sh was in the ED 3 days ago when the pain started and had a CT that showed mesenteric vasculitis but was feeling better, and was discharged. She states the pain feels like prior pain from her lupus, and the last time she had this pain was 2 years ago. She has taken 125 Solumedrol injections daily for the past 2 days but they do not appear to be helping. She was on Benlysta (Belimumab) for 1 year but did not need it anymore. Last seen by rheumatologist yesterday. No fevers or chills, no blood per rectum, and no hematuria or dysuria. Pt having regular menstrual periods. Rheumatologist/PCP, Dr. Goldberg, Avram    38 yo F with a PMH SLE and SLE-renal disease who presents with abdominal pain. She states the pain is an achy pain that "comes and goes" but nothing precipitates the pain or makes it better. She has had nausea and NBNB emesis, along with nonbloody diarrhea, last episodes this morning. She was in the ED 3 days ago when the pain started and had a CT that showed mesenteric vasculitis but was feeling better, and was discharged. She states the pain feels like prior pain from her lupus, and the last time she had this pain was 2 years ago. She has taken 125 Solumedrol injections daily for the past 2 days but they do not appear to be helping. She was on Benlysta (Belimumab) for 1 year but did not need it anymore. Last seen by rheumatologist yesterday. No fevers or chills, chest pain or SOB, no blood per rectum, and no hematuria or dysuria. Pt having regular menstrual periods.

## 2018-05-05 NOTE — ED PROVIDER NOTE - MEDICAL DECISION MAKING DETAILS
36 yo F with a PMH SLE and SLE-related renal disease who presents with 3 days abdominal pain, recently dx'd with mesenteric vasculitis. Most likely 2/2 SLE. Will check HCG to r/o pregnancy, lipase. No fevers, chills. No guarding to suggest perforation. Check lytes, CBC. IVFs. Pain meds, anti-emetics. 38 yo F with a PMH SLE and SLE-related renal disease who presents with 3 days abdominal pain, recently dx'd with mesenteric vasculitis. Most likely 2/2 SLE. HCG checked 3 days ago, no need to recheck.Will check lipase, lactate. No fevers, chills. No guarding to suggest perforation. Check lytes, CBC. IVFs. Pain meds, anti-emetics. 38 yo F with a PMH SLE and SLE-related renal disease who presents with 3 days abdominal pain, recently dx'd with mesenteric vasculitis. Most likely 2/2 SLE. HCG checked 3 days ago, no need to recheck. Will check lipase, lactate. No fevers, chills. No guarding to suggest perforation. Check lytes, CBC. IVFs. Pain meds, anti-emetics. 38 yo F with a PMH SLE and SLE-related renal disease who presents with 3 days abdominal pain, recently dx'd with mesenteric vasculitis. Most likely 2/2 SLE. HCG checked 3 days ago, but will recheck. Will check lipase, lactate. No fevers, chills. No guarding to suggest perforation. Check lytes, CBC. IVFs. Pain meds, anti-emetics. 36 yo F with a PMH SLE and SLE-related renal disease who presents with 3 days abdominal pain, recently dx'd with mesenteric vasculitis. Most likely 2/2 SLE. HCG checked 3 days ago, but will recheck. Will check lipase, lactate. No fevers, chills. No guarding to suggest perforation. Check lytes, CBC. IVFs. Pain meds, anti-emetics.    SOCRATES Jones MD: Pt is a 36 yo F with a PMH SLE and SLE-renal disease who presents with abdominal pain. She states the pain is an achy pain that "comes and goes" but nothing precipitates the pain or makes it better. She has had nausea and NBNB emesis, along with nonbloody diarrhea, last episodes this morning. She was in the ED 3 days ago when the pain started and had a CT that showed mesenteric vasculitis but was feeling better, and was discharged. She states the pain feels like prior pain from her lupus, and the last time she had this pain was 2 years ago. She has taken 125 Solumedrol injections daily for the past 2 days but they do not appear to be helping. She was on Benlysta (Belimumab) for 1 year but did not need it anymore. Last seen by rheumatologist yesterday. No fevers or chills, chest pain or SOB, no blood per rectum, and no hematuria or dysuria. Pt having regular menstrual periods. 36 yo F with a PMH SLE and SLE-related renal disease who presents with 3 days abdominal pain, recently dx'd with mesenteric vasculitis. Most likely 2/2 SLE. HCG checked 3 days ago, but will recheck. Will check lipase, lactate. No fevers, chills. No guarding to suggest perforation. Check lytes, CBC. IVFs. Pain meds, anti-emetics.    SOCRATES Jones MD: Pt is a 36 yo F with a PMH SLE and SLE-renal disease who presents with abdominal pain, n/v/d. Nothing makes pain better/worse. She has had nausea and NBNB emesis, along with nonbloody diarrhea, last episodes this morning. She was in the ED 3 days ago when the pain started and had a CT that showed mesenteric vasculitis but was feeling better so was discharged. She states the pain feels like prior pain from her lupus, and the last time she had this pain was 2 years ago. She has taken 125 Solumedrol injections daily for the past 2 days per her Rheumatologist's recommendations, but they do not appear to be helping. She was on Benlysta (Belimumab) for 1 year but did not need it anymore. Last seen by rheumatologist yesterday. No fevers or chills, chest pain or SOB, no blood per rectum, and no hematuria or dysuria. Pt having regular menstrual periods. Unable to tolerate PO 2/2 N/V. DDx: mesenteric vasculitis, gastroenteritis, dehydration, uti, metabolic d/o. Plan: basic labs, IVF, u/a, ucx, upreg, pain and nausea control

## 2018-05-05 NOTE — H&P ADULT - HISTORY OF PRESENT ILLNESS
38 yo F with a PMH SLE and SLE-renal disease who presents with abdominal pain. She states the pain is an achy pain that "comes and goes" but nothing precipitates the pain or makes it better. She has had nausea and NBNB emesis, along with nonbloody diarrhea, last episodes this morning. She was in the ED 3 days ago when the pain started and had a CT that showed mesenteric vasculitis but was feeling better, and was discharged. She states the pain feels like prior pain from her lupus, and the last time she had this pain was 2 years ago. She has taken 125 Solumedrol injections daily for the past 2 days but they do not appear to be helping. She was on Benlysta (Belimumab) for 1 year but did not need it anymore. Last seen by rheumatologist yesterday. No fevers or chills, chest pain or SOB, no blood per rectum, and no hematuria or dysuria. Pt having regular menstrual periods.

## 2018-05-05 NOTE — ED POST DISCHARGE NOTE - OTHER COMMUNICATION
patient is admitted to [facility], [address] [place] [address]. Patria Cruz PA-C

## 2018-05-05 NOTE — ED PROVIDER NOTE - PROGRESS NOTE DETAILS
Patient still in moderate distress after 1 mg IV Dilaudid and IV Acetaminophen, still nauseous after Zofran IV. Electrolytes abnormalities including hypokalemia (borderline), severe hypophosphatemia, and borderline hypocalcemia. Will admit for pain control and hydration, spoke with Dr. Mccoy who accepted pt. Will speak with PCP, Dr. Goldberg (PCP). Patient still in moderate distress after 1 mg IV Dilaudid and IV Acetaminophen, still nauseous after Zofran IV. Electrolytes abnormalities including hypokalemia (borderline), severe hypophosphatemia, and borderline hypocalcemia. Will admit for pain control and hydration, spoke with Dr. Mccoy who accepted pt. Will speak with PCP, Dr. Goldberg (PCP). Patient has >100K E coli from 3 days ago with suprapubic TTP, will treat w/Ceftriaxone for UTI Patient still in moderate distress after 1 mg IV Dilaudid and IV Acetaminophen, still nauseous after Zofran IV. Electrolytes abnormalities including hypokalemia (borderline), severe hypophosphatemia, and borderline hypocalcemia. Will admit for pain control and hydration, spoke with Dr. Mccoy who accepted pt. Will speak with PCP, Dr. Goldberg (PCP). Patient has >100K E coli from 3 days ago with suprapubic TTP, will treat w/Ceftriaxone for UTI. Spoke to PCP Dr. Goldberg, who recommended Solumedrol 125. Also noted to him UTI, for which we are treating. Patient's pain slightly improving.

## 2018-05-05 NOTE — ED ADULT NURSE REASSESSMENT NOTE - NS ED NURSE REASSESS COMMENT FT1
Patient states that her pain is returning, she was medicated with 1mg dilaudid IV. Report given to holding JAYLENE Harley, patient aware that she is to be transferred to holding

## 2018-05-05 NOTE — ED PROVIDER NOTE - ABDOMINAL TENDER
left upper quadrant/right lower quadrant/epigastric/suprapubic/right upper quadrant/left lower quadrant

## 2018-05-05 NOTE — ED ADULT NURSE NOTE - CHPI ED SYMPTOMS POS
Reviewed past INR and dosing with pt in the clinic. Denies missed or extra doses of warfarin or changes in other medications. Has been feeling well without concerns. INR within range. Will continue warfarin dose of 10 mg on Wed and Sat and 7.5 mg the other days. Recheck INR in 4 weeks. Reminded to call with any concerns or changes. INR and dosing per protocol Dr ESAU Huizar. On site provider today is Dr Carlin.    TENDERNESS/VOMITING/PAIN/DIARRHEA/NAUSEA

## 2018-05-06 LAB
APPEARANCE UR: CLEAR — SIGNIFICANT CHANGE UP
BILIRUB UR-MCNC: NEGATIVE — SIGNIFICANT CHANGE UP
COLOR SPEC: YELLOW — SIGNIFICANT CHANGE UP
DIFF PNL FLD: NEGATIVE — SIGNIFICANT CHANGE UP
GLUCOSE UR QL: NEGATIVE MG/DL — SIGNIFICANT CHANGE UP
KETONES UR-MCNC: ABNORMAL
LEUKOCYTE ESTERASE UR-ACNC: NEGATIVE — SIGNIFICANT CHANGE UP
NITRITE UR-MCNC: NEGATIVE — SIGNIFICANT CHANGE UP
PH UR: 6 — SIGNIFICANT CHANGE UP (ref 5–8)
PROT UR-MCNC: 300 MG/DL
SP GR SPEC: 1.02 — SIGNIFICANT CHANGE UP (ref 1.01–1.02)
UROBILINOGEN FLD QL: NEGATIVE MG/DL — SIGNIFICANT CHANGE UP

## 2018-05-06 RX ORDER — PANTOPRAZOLE SODIUM 20 MG/1
40 TABLET, DELAYED RELEASE ORAL DAILY
Qty: 0 | Refills: 0 | Status: DISCONTINUED | OUTPATIENT
Start: 2018-05-06 | End: 2018-05-08

## 2018-05-06 RX ADMIN — Medication 1 TABLET(S): at 21:50

## 2018-05-06 RX ADMIN — HYDROMORPHONE HYDROCHLORIDE 1 MILLIGRAM(S): 2 INJECTION INTRAMUSCULAR; INTRAVENOUS; SUBCUTANEOUS at 10:10

## 2018-05-06 RX ADMIN — Medication 1 TABLET(S): at 14:02

## 2018-05-06 RX ADMIN — Medication 100 MILLIGRAM(S): at 13:58

## 2018-05-06 RX ADMIN — SODIUM CHLORIDE 100 MILLILITER(S): 9 INJECTION, SOLUTION INTRAVENOUS at 06:18

## 2018-05-06 RX ADMIN — HYDROMORPHONE HYDROCHLORIDE 1 MILLIGRAM(S): 2 INJECTION INTRAMUSCULAR; INTRAVENOUS; SUBCUTANEOUS at 09:55

## 2018-05-06 RX ADMIN — Medication 100 MILLIGRAM(S): at 06:18

## 2018-05-06 RX ADMIN — SODIUM CHLORIDE 100 MILLILITER(S): 9 INJECTION, SOLUTION INTRAVENOUS at 09:58

## 2018-05-06 NOTE — CONSULT NOTE ADULT - ASSESSMENT
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  Presents with 4 days of recurrent abdominal pain, anorexia, nausea and vomiting.  CT showing bowel wall thickening.  This is most likely a recurrence of mesenteric vasculitis.    Rec:  Due to incomplete response to standard high dose steroids will start "mini pulse" of 500mg daily x 3 of Solumedrol.  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  Presents with 4 days of recurrent abdominal pain, anorexia, nausea and vomiting.  CT showing bowel wall thickening.  This is most likely a recurrence of mesenteric vasculitis.    Rec:  Due to incomplete response to standard high dose steroids will start "mini pulse" of 500mg daily x 3 of Solumedrol.  Depending on response she might need full pulse of 1000mg but due to h/o AVN I am hoping to spare her the additional steroids  Received one dose ABx; can hold further, and repeat UA  check C3, C4, dsDNA  NPO for now; can slowly advance diet as tolerated  IV Fluid

## 2018-05-06 NOTE — CONSULT NOTE ADULT - SUBJECTIVE AND OBJECTIVE BOX
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  She has had recurrent episodes of severe abdominal pain, nausea/vomiting/anorexia.  Fortunately, the episodes are vumagsfvpc-6-0t/year; she was last hospitalized 1 year ago, and had one attack since which was handled with outpatient IV steroids.   Often the attacks are precipitated by dietary changes.   The current episode started last week (4 days ago) with nausea, vomiting, abdominal pain, and inability to tolerate food.  She came to the ER 4 days ago and was treated with IV solumedrol and improved, and she was discharged.  Ct scan showed active bowel inflammation.  The next day the symptoms recurred and she was treated with Solumedrol 125mg in my office and improved.  2 days ago she felt well, but was given another dose of Solumedrol 125 empirically.  Yesterday the pain recurred again and she came to ER and was admitted.  She feels better now after more steroid doses, IV fluid, and pain meds.  Urinalysis showed no WBCs but culture came back with E. Coli and a dose of Ceftriaxone was given.   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  She was on monthly IV Benlasta for about 2 years, but stopped last year, since it was not clear that she had continued efficacy.     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 1000 milliLiter(s) (100 mL/Hr) IV Continuous <Continuous>  loratadine 10 milliGRAM(s) Oral daily  ondansetron   Disintegrating Tablet 4 milliGRAM(s) Oral four times a day    MEDICATIONS  (PRN):  HYDROmorphone  Injectable 1 milliGRAM(s) IV Push every 6 hours PRN Severe Pain (7 - 10)  ondansetron Injectable 4 milliGRAM(s) IV Push every 6 hours PRN Nausea and/or Vomiting      Allergies    No Known Allergies    Intolerances        PERTINENT MEDICATION HISTORY:    SOCIAL HISTORY:    FAMILY HISTORY:  Family history of hypercholesterolemia  Family history of hypertension: .      Vital Signs Last 24 Hrs  T(C): 36.6 (05 May 2018 22:18), Max: 37.1 (05 May 2018 11:39)  T(F): 97.9 (05 May 2018 22:18), Max: 98.8 (05 May 2018 11:39)  HR: 75 (05 May 2018 22:18) (59 - 84)  BP: 108/68 (05 May 2018 22:18) (102/67 - 126/86)  BP(mean): --  RR: 18 (05 May 2018 22:18) (16 - 18)  SpO2: 99% (05 May 2018 22:18) (95% - 100%)    Daily Height in cm: 165.1 (05 May 2018 18:30)    Daily     PHYSICAL EXAM:      Constitutional:  well appearing    Eyes:  EOMI    ENMT:    Neck:  supple    Breasts:    Back:    Respiratory:    Cardiovascular:    Gastrointestinal:  soft, +tenderness to palpation, no rebound or masses    Genitourinary:    Rectal:    Extremities:  No edema    Vascular:    Neurological:    Skin:  no rash, dryness of LE    Lymph Nodes:    Musculoskeletal:    Psychiatric:  appropriate and alert      LABS:                        12.3   11.7  )-----------( 163      ( 05 May 2018 11:33 )             36.2     05-05    142  |  109<H>  |  11  ----------------------------<  84  3.8   |  20<L>  |  0.68    Ca    7.8<L>      05 May 2018 11:33  Phos  1.1     05-05  Mg     2.5     05-05    TPro  5.8<L>  /  Alb  3.1<L>  /  TBili  0.3  /  DBili  x   /  AST  27  /  ALT  10  /  AlkPhos  34<L>  05-05          RADIOLOGY & ADDITIONAL STUDIES:  < from: CT Abdomen and Pelvis w/ Oral Cont and w/ IV Cont (05.02.18 @ 23:22) >  LIVER: Within normal limits.  BILE DUCTS: Normal caliber.  GALLBLADDER: Within normal limits.  SPLEEN: Within normal limits.  PANCREAS: Within normal limits.  ADRENALS: Within normal limits.  KIDNEYS/URETERS: Symmetric enhancement without hydronephrosis.    BLADDER: Within normal limits.  REPRODUCTIVE ORGANS: The uterus and adnexa are within normal limits.    BOWEL: Oral contrast is seen as distally as the stomach. Proximalsmall   bowel loops are unremarkable. There is significant diffuse mural   thickening of the jejunal and proximal ileal loops of small bowel.   Surrounding mesenteric edema is seen. No bowel obstruction. Appendix is   within normal limits.  PERITONEUM: No pneumoperitoneum. Small volume pelvic ascites.  VESSELS:  No evidence of aortic aneurysm. The common hepatic artery   arises from the SMA. The mesenteric arteries and veins appear patent   without stenosis..  RETROPERITONEUM: No lymphadenopathy.   ABDOMINAL WALL: Within normal limits.  BONES: Redemonstrated AVN changes of the bilateral femoral heads.    IMPRESSION:  Marked enteritis involving the jejunum and proximal ileum. The mesenteric   arteries and veins are patent.    < end of copied text >

## 2018-05-07 LAB
ANION GAP SERPL CALC-SCNC: 8 MMOL/L — SIGNIFICANT CHANGE UP (ref 5–17)
BUN SERPL-MCNC: 9 MG/DL — SIGNIFICANT CHANGE UP (ref 7–23)
C3 SERPL-MCNC: 57 MG/DL — LOW (ref 80–180)
C4 SERPL-MCNC: 14 MG/DL — SIGNIFICANT CHANGE UP (ref 10–45)
CALCIUM SERPL-MCNC: 8.3 MG/DL — LOW (ref 8.4–10.5)
CHLORIDE SERPL-SCNC: 104 MMOL/L — SIGNIFICANT CHANGE UP (ref 96–108)
CO2 SERPL-SCNC: 26 MMOL/L — SIGNIFICANT CHANGE UP (ref 22–31)
CREAT SERPL-MCNC: 0.63 MG/DL — SIGNIFICANT CHANGE UP (ref 0.5–1.3)
GLUCOSE SERPL-MCNC: 122 MG/DL — HIGH (ref 70–99)
HCT VFR BLD CALC: 35 % — SIGNIFICANT CHANGE UP (ref 34.5–45)
HGB BLD-MCNC: 11.7 G/DL — SIGNIFICANT CHANGE UP (ref 11.5–15.5)
MCHC RBC-ENTMCNC: 29.5 PG — SIGNIFICANT CHANGE UP (ref 27–34)
MCHC RBC-ENTMCNC: 33.4 GM/DL — SIGNIFICANT CHANGE UP (ref 32–36)
MCV RBC AUTO: 88.4 FL — SIGNIFICANT CHANGE UP (ref 80–100)
PLATELET # BLD AUTO: 164 K/UL — SIGNIFICANT CHANGE UP (ref 150–400)
POTASSIUM SERPL-MCNC: 4.4 MMOL/L — SIGNIFICANT CHANGE UP (ref 3.5–5.3)
POTASSIUM SERPL-SCNC: 4.4 MMOL/L — SIGNIFICANT CHANGE UP (ref 3.5–5.3)
RBC # BLD: 3.96 M/UL — SIGNIFICANT CHANGE UP (ref 3.8–5.2)
RBC # FLD: 12.2 % — SIGNIFICANT CHANGE UP (ref 10.3–14.5)
SODIUM SERPL-SCNC: 138 MMOL/L — SIGNIFICANT CHANGE UP (ref 135–145)
WBC # BLD: 4.33 K/UL — SIGNIFICANT CHANGE UP (ref 3.8–10.5)
WBC # FLD AUTO: 4.33 K/UL — SIGNIFICANT CHANGE UP (ref 3.8–10.5)

## 2018-05-07 RX ORDER — ACETAMINOPHEN 500 MG
500 TABLET ORAL ONCE
Qty: 0 | Refills: 0 | Status: COMPLETED | OUTPATIENT
Start: 2018-05-07 | End: 2018-05-07

## 2018-05-07 RX ADMIN — Medication 1 TABLET(S): at 17:15

## 2018-05-07 RX ADMIN — Medication 100 MILLIGRAM(S): at 06:42

## 2018-05-07 RX ADMIN — HYDROMORPHONE HYDROCHLORIDE 1 MILLIGRAM(S): 2 INJECTION INTRAMUSCULAR; INTRAVENOUS; SUBCUTANEOUS at 21:05

## 2018-05-07 RX ADMIN — SODIUM CHLORIDE 100 MILLILITER(S): 9 INJECTION, SOLUTION INTRAVENOUS at 20:50

## 2018-05-07 RX ADMIN — Medication 500 MILLIGRAM(S): at 06:44

## 2018-05-07 RX ADMIN — Medication 1 TABLET(S): at 21:34

## 2018-05-07 RX ADMIN — HYDROMORPHONE HYDROCHLORIDE 1 MILLIGRAM(S): 2 INJECTION INTRAMUSCULAR; INTRAVENOUS; SUBCUTANEOUS at 02:25

## 2018-05-07 RX ADMIN — PANTOPRAZOLE SODIUM 40 MILLIGRAM(S): 20 TABLET, DELAYED RELEASE ORAL at 11:03

## 2018-05-07 RX ADMIN — Medication 1 TABLET(S): at 11:02

## 2018-05-07 RX ADMIN — SODIUM CHLORIDE 100 MILLILITER(S): 9 INJECTION, SOLUTION INTRAVENOUS at 13:07

## 2018-05-07 RX ADMIN — HYDROMORPHONE HYDROCHLORIDE 1 MILLIGRAM(S): 2 INJECTION INTRAMUSCULAR; INTRAVENOUS; SUBCUTANEOUS at 20:47

## 2018-05-07 RX ADMIN — HYDROMORPHONE HYDROCHLORIDE 1 MILLIGRAM(S): 2 INJECTION INTRAMUSCULAR; INTRAVENOUS; SUBCUTANEOUS at 01:50

## 2018-05-07 RX ADMIN — Medication 200 MILLIGRAM(S): at 05:41

## 2018-05-07 NOTE — PROGRESS NOTE ADULT - PROBLEM SELECTOR PROBLEM 3
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

## 2018-05-07 NOTE — PROGRESS NOTE ADULT - ASSESSMENT
38 yo female h/o SLE, vasculitis, a/w abd pain, nausea, vomiting, likely 2/2 mesenteric vasculitis    rheum eval noted  iv steroids as per rheum  clears diet  ppi  serial abd exams    +urine cult  likely contaminant  recheck   hold further abx for now  pt w/o symptoms
38 yo female h/o SLE, vasculitis, a/w abd pain, nausea, vomiting, likely 2/2 mesenteric vasculitis    rheum eval noted  iv steroids as per rheum  pulse for 3 days.   advance diet as tolerated   ppi  serial abd exams    +urine cult  likely contaminant  recheck   hold further abx for now  pt w/o symptoms   UA negative
Imp:  SLE with mesenteric vasculitis flare up.  Improving on steroids    Rec:  continue Solumedrol 500mg/day to complete 3 days-today day #2  Advance diet as tolerated  Exercise as able  agree with holding further Abx

## 2018-05-07 NOTE — PROGRESS NOTE ADULT - SUBJECTIVE AND OBJECTIVE BOX
JUVENTINO STAPLES  37y Female  MRN:[id number]    Patient is a 37y old  Female who presents with a chief complaint of nausea vomiting and abd pain (05 May 2018 19:42)    HPI:  36 yo F with a PMH SLE and SLE-renal disease who presents with abdominal pain. She states the pain is an achy pain that "comes and goes" but nothing precipitates the pain or makes it better. She has had nausea and NBNB emesis, along with nonbloody diarrhea, last episodes this morning. She was in the ED 3 days ago when the pain started and had a CT that showed mesenteric vasculitis but was feeling better, and was discharged. She states the pain feels like prior pain from her lupus, and the last time she had this pain was 2 years ago. She has taken 125 Solumedrol injections daily for the past 2 days but they do not appear to be helping. She was on Benlysta (Belimumab) for 1 year but did not need it anymore. Last seen by rheumatologist yesterday. No fevers or chills, chest pain or SOB, no blood per rectum, and no hematuria or dysuria. Pt having regular menstrual periods. (05 May 2018 16:55)      Patient seen and evaluated at bedside. No acute events overnight except as noted.    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  Skin: No rash.  Eyes: No recent vision problems or eye pain.  ENT: No congestion, ear pain, or sore throat.  Endocrine: No thyroid problems.  Cardiovascular: No chest pain or palpation.  Respiratory: No cough, shortness of breath, congestion, or wheezing.  Gastrointestinal: as per hpi  Genitourinary: No dysuria.  Musculoskeletal: No joint swelling.  Neurologic: No headache.    VITALS:  Vital Signs Last 24 Hrs  T(C): 36.4 (06 May 2018 21:36), Max: 36.9 (06 May 2018 17:04)  T(F): 97.5 (06 May 2018 21:36), Max: 98.5 (06 May 2018 17:04)  HR: 60 (06 May 2018 21:36) (60 - 61)  BP: 96/61 (06 May 2018 21:36) (96/61 - 112/71)  BP(mean): --  RR: 18 (06 May 2018 21:36) (18 - 18)  SpO2: 99% (06 May 2018 21:36) (99% - 99%)  CAPILLARY BLOOD GLUCOSE        I&O's Summary    05 May 2018 07:01  -  06 May 2018 07:00  --------------------------------------------------------  IN: 1100 mL / OUT: 0 mL / NET: 1100 mL    06 May 2018 07:01  -  06 May 2018 23:51  --------------------------------------------------------  IN: 2400 mL / OUT: 0 mL / NET: 2400 mL        PHYSICAL EXAM:  GENERAL: NAD, well-developed  HEAD:  Atraumatic, Normocephalic  EYES: EOMI, PERRLA, conjunctiva and sclera clear  NECK: Supple, No JVD  CHEST/LUNG: Clear to auscultation bilaterally; No wheeze  HEART: S1, S2; No murmurs, rubs, or gallops  ABDOMEN: Soft, Nontender, Nondistended; Bowel sounds present  EXTREMITIES:  2+ Peripheral Pulses, No clubbing, cyanosis, or edema  PSYCH: Normal affect  NEUROLOGY: AAOX3; non-focal  SKIN: No rashes or lesions    Consultant(s) Notes Reviewed:  [x ] YES  [ ] NO  Care Discussed with Consultants/Other Providers [ x] YES  [ ] NO    MEDS:  MEDICATIONS  (STANDING):  calcium carbonate 1250 mG + Vitamin D (OsCal 500 + D) 1 Tablet(s) Oral three times a day  enoxaparin Injectable 40 milliGRAM(s) SubCutaneous every 24 hours  lactated ringers. 1000 milliLiter(s) (100 mL/Hr) IV Continuous <Continuous>  loratadine 10 milliGRAM(s) Oral daily  methylPREDNISolone sodium succinate IVPB 500 milliGRAM(s) IV Intermittent daily  ondansetron   Disintegrating Tablet 4 milliGRAM(s) Oral four times a day  pantoprazole  Injectable 40 milliGRAM(s) IV Push daily    MEDICATIONS  (PRN):  HYDROmorphone  Injectable 1 milliGRAM(s) IV Push every 6 hours PRN Severe Pain (7 - 10)  ondansetron Injectable 4 milliGRAM(s) IV Push every 6 hours PRN Nausea and/or Vomiting    ALLERGIES:  No Known Allergies      LABS:                        12.3   11.7  )-----------( 163      ( 05 May 2018 11:33 )             36.2     05-05    142  |  109<H>  |  11  ----------------------------<  84  3.8   |  20<L>  |  0.68    Ca    7.8<L>      05 May 2018 11:33  Phos  1.1     05-05  Mg     2.5     05-05    TPro  5.8<L>  /  Alb  3.1<L>  /  TBili  0.3  /  DBili  x   /  AST  27  /  ALT  10  /  AlkPhos  34<L>  05-05          LIVER FUNCTIONS - ( 05 May 2018 11:33 )  Alb: 3.1 g/dL / Pro: 5.8 g/dL / ALK PHOS: 34 U/L / ALT: 10 U/L / AST: 27 U/L / GGT: x           Urinalysis Basic - ( 06 May 2018 14:25 )    Color: Yellow / Appearance: Clear / S.023 / pH: x  Gluc: x / Ketone: Moderate  / Bili: Negative / Urobili: Negative mg/dL   Blood: x / Protein: 300 mg/dL / Nitrite: Negative   Leuk Esterase: Negative / RBC: 5 /HPF / WBC 2 /HPF   Sq Epi: x / Non Sq Epi: 5 /HPF / Bacteria: Negative
INTERVAL HPI/OVERNIGHT EVENTS:  36 yo female with lupus and mesenteric vasculitis; admitted with severe bowel inflammation likely related to lupus exacerbation.  Much improved on "mini pulse" of steroids.  Has been walking around and tolerated solids earlier today    MEDICATIONS  (STANDING):  calcium carbonate 1250 mG + Vitamin D (OsCal 500 + D) 1 Tablet(s) Oral three times a day  enoxaparin Injectable 40 milliGRAM(s) SubCutaneous every 24 hours  lactated ringers. 1000 milliLiter(s) (100 mL/Hr) IV Continuous <Continuous>  loratadine 10 milliGRAM(s) Oral daily  methylPREDNISolone sodium succinate IVPB 500 milliGRAM(s) IV Intermittent daily  ondansetron   Disintegrating Tablet 4 milliGRAM(s) Oral four times a day  pantoprazole  Injectable 40 milliGRAM(s) IV Push daily    MEDICATIONS  (PRN):  HYDROmorphone  Injectable 1 milliGRAM(s) IV Push every 6 hours PRN Severe Pain (7 - 10)  ondansetron Injectable 4 milliGRAM(s) IV Push every 6 hours PRN Nausea and/or Vomiting      Allergies    No Known Allergies    Intolerances        REVIEW OF SYSTEMS    General:	  overall feeling better    Skin/Breast:  	no rashes    Ophthalmologic:  	  ENMT:	    Respiratory and Thorax:  	  Cardiovascular:	  no CP/SOB    Gastrointestinal:	  abd symptoms improving    Genitourinary:	  no dysuria    Musculoskeletal:	  mild knee swelling, no pain    	      Vital Signs Last 24 Hrs  T(C): 36.8 (07 May 2018 15:46), Max: 36.8 (07 May 2018 05:53)  T(F): 98.2 (07 May 2018 15:46), Max: 98.3 (07 May 2018 05:53)  HR: 74 (07 May 2018 15:46) (54 - 75)  BP: 91/60 (07 May 2018 15:46) (91/60 - 115/73)  BP(mean): --  RR: 18 (07 May 2018 15:46) (16 - 18)  SpO2: 98% (07 May 2018 15:46) (98% - 100%)      PHYSICAL EXAM:    Constitutional:  well appearing  Pt seen walking around floor    Eyes:  EOMI, no injection    ENMT:    Neck:  supple     Back:    Respiratory:    Cardiovascular:    Gastrointestinal:  abd-soft, decreased tenderness, no distention, no rebound    Extremities:  trace edema    Vascular:    Neurological:    Skin:    Lymph Nodes:    Musculoskeletal:  good ROM, no synovitis.  Mild knee swelling    Psychiatric:  appropriate, alert and oriented    LABS:                        11.7   4.33  )-----------( 164      ( 07 May 2018 07:36 )             35.0     05-07    138  |  104  |  9   ----------------------------<  122<H>  4.4   |  26  |  0.63    Ca    8.3<L>      07 May 2018 06:39        Urinalysis Basic - ( 06 May 2018 14:25 )    Color: Yellow / Appearance: Clear / S.023 / pH: x  Gluc: x / Ketone: Moderate  / Bili: Negative / Urobili: Negative mg/dL   Blood: x / Protein: 300 mg/dL / Nitrite: Negative   Leuk Esterase: Negative / RBC: 5 /HPF / WBC 2 /HPF   Sq Epi: x / Non Sq Epi: 5 /HPF / Bacteria: Negative        RADIOLOGY & ADDITIONAL TESTS:
JUVENTINO STAPLES  37y Female  MRN:[id number]    Patient is a 37y old  Female who presents with a chief complaint of nausea vomiting and abd pain (05 May 2018 19:42)    HPI:  38 yo F with a PMH SLE and SLE-renal disease who presents with abdominal pain. She states the pain is an achy pain that "comes and goes" but nothing precipitates the pain or makes it better. She has had nausea and NBNB emesis, along with nonbloody diarrhea, last episodes this morning. She was in the ED 3 days ago when the pain started and had a CT that showed mesenteric vasculitis but was feeling better, and was discharged. She states the pain feels like prior pain from her lupus, and the last time she had this pain was 2 years ago. She has taken 125 Solumedrol injections daily for the past 2 days but they do not appear to be helping. She was on Benlysta (Belimumab) for 1 year but did not need it anymore. Last seen by rheumatologist yesterday. No fevers or chills, chest pain or SOB, no blood per rectum, and no hematuria or dysuria. Pt having regular menstrual periods. (05 May 2018 16:55)      Patient seen and evaluated at bedside. No acute events overnight except as noted.    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  Skin: No rash.  Eyes: No recent vision problems or eye pain.  ENT: No congestion, ear pain, or sore throat.  Endocrine: No thyroid problems.  Cardiovascular: No chest pain or palpation.  Respiratory: No cough, shortness of breath, congestion, or wheezing.  Gastrointestinal: as per hpi  Genitourinary: No dysuria.  Musculoskeletal: No joint swelling.  Neurologic: No headache.    VITALS:   Vital Signs Last 24 Hrs  T(C): 36.7 (07 May 2018 10:39), Max: 36.9 (06 May 2018 17:04)  T(F): 98 (07 May 2018 10:39), Max: 98.5 (06 May 2018 17:04)  HR: 75 (07 May 2018 10:39) (54 - 75)  BP: 101/68 (07 May 2018 10:39) (96/61 - 115/73)  BP(mean): --  RR: 18 (07 May 2018 10:39) (16 - 18)  SpO2: 98% (07 May 2018 10:39) (98% - 100%)        PHYSICAL EXAM:  GENERAL: NAD, well-developed  HEAD:  Atraumatic, Normocephalic  EYES: EOMI, PERRLA, conjunctiva and sclera clear  NECK: Supple, No JVD  CHEST/LUNG: Clear to auscultation bilaterally; No wheeze  HEART: S1, S2; No murmurs, rubs, or gallops  ABDOMEN: Soft, Nontender, Nondistended; Bowel sounds present  EXTREMITIES:  2+ Peripheral Pulses, No clubbing, cyanosis, or edema  PSYCH: Normal affect  NEUROLOGY: AAOX3; non-focal  SKIN: No rashes or lesions    Consultant(s) Notes Reviewed:  [x ] YES  [ ] NO  Care Discussed with Consultants/Other Providers [ x] YES  [ ] NO    MEDS:  MEDICATIONS  (STANDING):  calcium carbonate 1250 mG + Vitamin D (OsCal 500 + D) 1 Tablet(s) Oral three times a day  enoxaparin Injectable 40 milliGRAM(s) SubCutaneous every 24 hours  lactated ringers. 1000 milliLiter(s) (100 mL/Hr) IV Continuous <Continuous>  loratadine 10 milliGRAM(s) Oral daily  methylPREDNISolone sodium succinate IVPB 500 milliGRAM(s) IV Intermittent daily  ondansetron   Disintegrating Tablet 4 milliGRAM(s) Oral four times a day  pantoprazole  Injectable 40 milliGRAM(s) IV Push daily    MEDICATIONS  (PRN):  HYDROmorphone  Injectable 1 milliGRAM(s) IV Push every 6 hours PRN Severe Pain (7 - 10)  ondansetron Injectable 4 milliGRAM(s) IV Push every 6 hours PRN Nausea and/or Vomiting      ALLERGIES:  No Known Allergies      LABS:                                         11.7   4.33  )-----------( 164      ( 07 May 2018 07:36 )             35.0   05-07    138  |  104  |  9   ----------------------------<  122<H>  4.4   |  26  |  0.63    Ca    8.3<L>      07 May 2018 06:39

## 2018-05-08 ENCOUNTER — TRANSCRIPTION ENCOUNTER (OUTPATIENT)
Age: 38
End: 2018-05-08

## 2018-05-08 VITALS
TEMPERATURE: 98 F | RESPIRATION RATE: 18 BRPM | SYSTOLIC BLOOD PRESSURE: 101 MMHG | DIASTOLIC BLOOD PRESSURE: 68 MMHG | HEART RATE: 81 BPM | OXYGEN SATURATION: 99 %

## 2018-05-08 LAB
ANION GAP SERPL CALC-SCNC: 10 MMOL/L — SIGNIFICANT CHANGE UP (ref 5–17)
BUN SERPL-MCNC: 13 MG/DL — SIGNIFICANT CHANGE UP (ref 7–23)
CALCIUM SERPL-MCNC: 8.4 MG/DL — SIGNIFICANT CHANGE UP (ref 8.4–10.5)
CHLORIDE SERPL-SCNC: 104 MMOL/L — SIGNIFICANT CHANGE UP (ref 96–108)
CO2 SERPL-SCNC: 27 MMOL/L — SIGNIFICANT CHANGE UP (ref 22–31)
CREAT SERPL-MCNC: 0.64 MG/DL — SIGNIFICANT CHANGE UP (ref 0.5–1.3)
DSDNA AB SER-ACNC: 44 IU/ML — HIGH
GLUCOSE SERPL-MCNC: 66 MG/DL — LOW (ref 70–99)
HCT VFR BLD CALC: 34.4 % — LOW (ref 34.5–45)
HGB BLD-MCNC: 11.7 G/DL — SIGNIFICANT CHANGE UP (ref 11.5–15.5)
MAGNESIUM SERPL-MCNC: 2 MG/DL — SIGNIFICANT CHANGE UP (ref 1.6–2.6)
MCHC RBC-ENTMCNC: 29.2 PG — SIGNIFICANT CHANGE UP (ref 27–34)
MCHC RBC-ENTMCNC: 34 GM/DL — SIGNIFICANT CHANGE UP (ref 32–36)
MCV RBC AUTO: 85.8 FL — SIGNIFICANT CHANGE UP (ref 80–100)
PLATELET # BLD AUTO: 193 K/UL — SIGNIFICANT CHANGE UP (ref 150–400)
POTASSIUM SERPL-MCNC: 3.6 MMOL/L — SIGNIFICANT CHANGE UP (ref 3.5–5.3)
POTASSIUM SERPL-SCNC: 3.6 MMOL/L — SIGNIFICANT CHANGE UP (ref 3.5–5.3)
RBC # BLD: 4.01 M/UL — SIGNIFICANT CHANGE UP (ref 3.8–5.2)
RBC # FLD: 12.5 % — SIGNIFICANT CHANGE UP (ref 10.3–14.5)
SODIUM SERPL-SCNC: 141 MMOL/L — SIGNIFICANT CHANGE UP (ref 135–145)
WBC # BLD: 8.58 K/UL — SIGNIFICANT CHANGE UP (ref 3.8–10.5)
WBC # FLD AUTO: 8.58 K/UL — SIGNIFICANT CHANGE UP (ref 3.8–10.5)

## 2018-05-08 PROCEDURE — 80053 COMPREHEN METABOLIC PANEL: CPT

## 2018-05-08 PROCEDURE — 80048 BASIC METABOLIC PNL TOTAL CA: CPT

## 2018-05-08 PROCEDURE — 96375 TX/PRO/DX INJ NEW DRUG ADDON: CPT

## 2018-05-08 PROCEDURE — 96376 TX/PRO/DX INJ SAME DRUG ADON: CPT

## 2018-05-08 PROCEDURE — 86160 COMPLEMENT ANTIGEN: CPT

## 2018-05-08 PROCEDURE — 93005 ELECTROCARDIOGRAM TRACING: CPT

## 2018-05-08 PROCEDURE — 83690 ASSAY OF LIPASE: CPT

## 2018-05-08 PROCEDURE — 84702 CHORIONIC GONADOTROPIN TEST: CPT

## 2018-05-08 PROCEDURE — 96374 THER/PROPH/DIAG INJ IV PUSH: CPT

## 2018-05-08 PROCEDURE — 82947 ASSAY GLUCOSE BLOOD QUANT: CPT

## 2018-05-08 PROCEDURE — 83605 ASSAY OF LACTIC ACID: CPT

## 2018-05-08 PROCEDURE — 99285 EMERGENCY DEPT VISIT HI MDM: CPT | Mod: 25

## 2018-05-08 PROCEDURE — 84295 ASSAY OF SERUM SODIUM: CPT

## 2018-05-08 PROCEDURE — 84132 ASSAY OF SERUM POTASSIUM: CPT

## 2018-05-08 PROCEDURE — 82435 ASSAY OF BLOOD CHLORIDE: CPT

## 2018-05-08 PROCEDURE — 82803 BLOOD GASES ANY COMBINATION: CPT

## 2018-05-08 PROCEDURE — 82330 ASSAY OF CALCIUM: CPT

## 2018-05-08 PROCEDURE — 87086 URINE CULTURE/COLONY COUNT: CPT

## 2018-05-08 PROCEDURE — 85027 COMPLETE CBC AUTOMATED: CPT

## 2018-05-08 PROCEDURE — 85014 HEMATOCRIT: CPT

## 2018-05-08 PROCEDURE — 86225 DNA ANTIBODY NATIVE: CPT

## 2018-05-08 PROCEDURE — 84100 ASSAY OF PHOSPHORUS: CPT

## 2018-05-08 PROCEDURE — 81001 URINALYSIS AUTO W/SCOPE: CPT

## 2018-05-08 PROCEDURE — 83735 ASSAY OF MAGNESIUM: CPT

## 2018-05-08 RX ADMIN — HYDROMORPHONE HYDROCHLORIDE 1 MILLIGRAM(S): 2 INJECTION INTRAMUSCULAR; INTRAVENOUS; SUBCUTANEOUS at 02:12

## 2018-05-08 RX ADMIN — Medication 1 TABLET(S): at 06:24

## 2018-05-08 RX ADMIN — HYDROMORPHONE HYDROCHLORIDE 1 MILLIGRAM(S): 2 INJECTION INTRAMUSCULAR; INTRAVENOUS; SUBCUTANEOUS at 01:57

## 2018-05-08 RX ADMIN — Medication 100 MILLIGRAM(S): at 06:24

## 2018-05-08 RX ADMIN — PANTOPRAZOLE SODIUM 40 MILLIGRAM(S): 20 TABLET, DELAYED RELEASE ORAL at 14:00

## 2018-05-08 NOTE — DISCHARGE NOTE ADULT - HOSPITAL COURSE
36 yo F with a PMH SLE and SLE-renal disease who presents with abdominal pain. Previous CT that showed mesenteric vasculitis but was feeling better at that time but now with recurring GI symptoms. During hospitalization, pt completed 3-day course of IV steroids with considerable relief of symptoms, was able to tolerate advanced diet, was incidentally found with + cx, with no urinary symptoms. Repeat UA & Ucx were negative, with no leukocytosis on labs. Pt deemed clinically stable for d/c, with 3-day course of Prednisone and f/u with Dr. Goldberg within the next 3 days.

## 2018-05-08 NOTE — DISCHARGE NOTE ADULT - PATIENT PORTAL LINK FT
You can access the [facility] Patient Portal, offered by [facility], by registering with the following website: [email or web address]

## 2018-05-08 NOTE — DISCHARGE NOTE ADULT - MEDICATION SUMMARY - MEDICATIONS TO CHANGE
I will SWITCH the dose or number of times a day I take the medications listed below when I get home from the hospital:    predniSONE 10 mg oral tablet  -- 30 tab(s) by mouth once a day, to be on a tapered dose as instructed and directed by your PMD/ Rheumatologist

## 2018-05-08 NOTE — DISCHARGE NOTE ADULT - MEDICATION SUMMARY - MEDICATIONS TO TAKE
I will START or STAY ON the medications listed below when I get home from the hospital:    Please excuse Ms. 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  Do not skip doses or discontinue unless directed by your doctor.  Obtain medical advice before taking any non-prescription drugs as some may affect the action of this medication.  Take with food or milk.    -- Indication: For Vasculitis    Zofran ODT 4 mg oral tablet, disintegrating  -- 1 tab(s) by mouth every 6 hours   -- Indication: For Noninfectious gastroenteritis    ZyrTEC 10 mg oral tablet  -- 1 tab(s) by mouth once a day, As Needed  -- Indication: For Allergies    Calcium 600+D oral tablet  -- 1 tab(s) by mouth once a day  -- Indication: For Prophylactic measure

## 2018-05-08 NOTE — DISCHARGE NOTE ADULT - PLAN OF CARE
Resolved -Continue regular diet (advance as tolerated), remain hydrated Mesenteric vasculitis with flare-up: completed Solumedrol 500mg/day for 3 days  Advance diet as tolerated  Exercise as able  Prednisone 30mg x 3 more days, and f/u with Dr. Goldberg by Day 3 of steroids -F/u with Dr. Goldberg within the next 3 days as planned

## 2018-05-08 NOTE — DISCHARGE NOTE ADULT - CARE PROVIDER_API CALL
Goldberg, Avram Z (MD), Internal Medicine; Rheumatology  [address] [facility] [address]  [place] [address]  Phone: [phone]  Fax: [phone]

## 2018-05-08 NOTE — DISCHARGE NOTE ADULT - MEDICATION SUMMARY - MEDICATIONS TO STOP TAKING
I will STOP taking the medications listed below when I get home from the hospital:    predniSONE 20 mg oral tablet  -- 2 tab(s) by mouth once a day   -- It is very important that you take or use this exactly as directed.  Do not skip doses or discontinue unless directed by your doctor.  Obtain medical advice before taking any non-prescription drugs as some may affect the action of this medication.  Take with food or milk.

## 2018-05-09 LAB
CULTURE RESULTS: SIGNIFICANT CHANGE UP
SPECIMEN SOURCE: SIGNIFICANT CHANGE UP

## 2018-05-31 ENCOUNTER — APPOINTMENT (OUTPATIENT)
Dept: PLASTIC SURGERY | Facility: CLINIC | Age: 38
End: 2018-05-31
Payer: COMMERCIAL

## 2018-05-31 VITALS
SYSTOLIC BLOOD PRESSURE: 101 MMHG | BODY MASS INDEX: 17 KG/M2 | DIASTOLIC BLOOD PRESSURE: 71 MMHG | HEART RATE: 96 BPM | HEIGHT: 65 IN | TEMPERATURE: 98.2 F | WEIGHT: 102 LBS

## 2018-05-31 PROCEDURE — 99202 OFFICE O/P NEW SF 15 MIN: CPT

## 2018-06-25 ENCOUNTER — EMERGENCY (EMERGENCY)
Facility: HOSPITAL | Age: 38
LOS: 1 days | Discharge: HOME | End: 2018-06-25
Admitting: EMERGENCY MEDICINE
Payer: COMMERCIAL

## 2018-06-25 ENCOUNTER — APPOINTMENT (OUTPATIENT)
Dept: PLASTIC SURGERY | Facility: CLINIC | Age: 38
End: 2018-06-25
Payer: COMMERCIAL

## 2018-06-25 VITALS
RESPIRATION RATE: 18 BRPM | SYSTOLIC BLOOD PRESSURE: 113 MMHG | OXYGEN SATURATION: 100 % | HEART RATE: 77 BPM | DIASTOLIC BLOOD PRESSURE: 85 MMHG | TEMPERATURE: 98 F

## 2018-06-25 PROCEDURE — 14060 TIS TRNFR E/N/E/L 10 SQ CM/<: CPT

## 2018-06-25 PROCEDURE — 12001 RPR S/N/AX/GEN/TRNK 2.5CM/<: CPT | Mod: F5

## 2018-06-25 PROCEDURE — 99282 EMERGENCY DEPT VISIT SF MDM: CPT | Mod: 25

## 2018-06-25 NOTE — ED ADULT TRIAGE NOTE - CHIEF COMPLAINT QUOTE
Pt cut right thumb with vegetable slicer. Piece of skin is missing- some bleeding in triage. Lac about 1cm long.

## 2018-06-26 NOTE — ED PROVIDER NOTE - MEDICAL DECISION MAKING DETAILS
A: 40yo F presents w/ skin avulsion after using mandolin slicer at home  -Tetanus up to date  -Dressing with xeroform and gauze wrapping applied by me, pt given instruction for daily dressing changes, follow up with Hand Specialist if needed.

## 2018-06-26 NOTE — ED PROVIDER NOTE - PROGRESS NOTE DETAILS
ALEC [place]: I have personally seen and examined this patient. I have reviewed and addended the HPI, ROS, PE, and A/P as necessary. I agree with the above plan of care.

## 2018-06-26 NOTE — ED PROVIDER NOTE - OBJECTIVE STATEMENT
36yo F, right hand dominant, presents to the ED c/o R thumb skin avulsion. Pt states she cut right thumb with vegetable mandolin. Pt applied a gauze at home which is dried to the finger. Pt denies any numbness or paresthesias of the extremities. No other injuries. Last tetanus 1 year ago per pt.

## 2018-06-26 NOTE — ED PROVIDER NOTE - CARE PLAN
Principal Discharge DX:	Skin avulsion  Assessment and plan of treatment:	Daily dressing changes, follow up with Hand Specialist if needed.

## 2018-07-02 ENCOUNTER — APPOINTMENT (OUTPATIENT)
Dept: PLASTIC SURGERY | Facility: CLINIC | Age: 38
End: 2018-07-02
Payer: COMMERCIAL

## 2018-07-02 PROCEDURE — 99024 POSTOP FOLLOW-UP VISIT: CPT

## 2018-07-09 ENCOUNTER — APPOINTMENT (OUTPATIENT)
Dept: HUMAN REPRODUCTION | Facility: CLINIC | Age: 38
End: 2018-07-09
Payer: COMMERCIAL

## 2018-07-09 PROCEDURE — 36415 COLL VENOUS BLD VENIPUNCTURE: CPT

## 2018-07-09 PROCEDURE — 99215 OFFICE O/P EST HI 40 MIN: CPT

## 2018-07-10 RX ORDER — LEVOTHYROXINE SODIUM 0.05 MG/1
50 TABLET ORAL DAILY
Qty: 30 | Refills: 0 | Status: ACTIVE | COMMUNITY
Start: 2018-07-10 | End: 1900-01-01

## 2018-07-16 ENCOUNTER — APPOINTMENT (OUTPATIENT)
Dept: HUMAN REPRODUCTION | Facility: CLINIC | Age: 38
End: 2018-07-16

## 2018-07-16 ENCOUNTER — APPOINTMENT (OUTPATIENT)
Dept: PLASTIC SURGERY | Facility: CLINIC | Age: 38
End: 2018-07-16
Payer: COMMERCIAL

## 2018-07-16 DIAGNOSIS — L90.5 SCAR CONDITIONS AND FIBROSIS OF SKIN: ICD-10-CM

## 2018-07-16 PROCEDURE — 99024 POSTOP FOLLOW-UP VISIT: CPT

## 2018-07-18 ENCOUNTER — OUTPATIENT (OUTPATIENT)
Dept: OUTPATIENT SERVICES | Facility: HOSPITAL | Age: 38
LOS: 1 days | End: 2018-07-18
Payer: COMMERCIAL

## 2018-07-18 ENCOUNTER — APPOINTMENT (OUTPATIENT)
Dept: RADIOLOGY | Facility: HOSPITAL | Age: 38
End: 2018-07-18

## 2018-07-18 ENCOUNTER — APPOINTMENT (OUTPATIENT)
Dept: HUMAN REPRODUCTION | Facility: CLINIC | Age: 38
End: 2018-07-18
Payer: COMMERCIAL

## 2018-07-18 DIAGNOSIS — N97.1 FEMALE INFERTILITY OF TUBAL ORIGIN: ICD-10-CM

## 2018-07-18 PROCEDURE — 58340 CATHETER FOR HYSTEROGRAPHY: CPT

## 2018-07-18 PROCEDURE — 36415 COLL VENOUS BLD VENIPUNCTURE: CPT

## 2018-07-18 PROCEDURE — 74740 X-RAY FEMALE GENITAL TRACT: CPT

## 2018-07-18 PROCEDURE — 58340 CATHETER FOR HYSTEROGRAPHY: CPT | Mod: 59

## 2018-07-18 PROCEDURE — 99214 OFFICE O/P EST MOD 30 MIN: CPT | Mod: 25

## 2018-07-18 PROCEDURE — 74740 X-RAY FEMALE GENITAL TRACT: CPT | Mod: 59

## 2018-09-10 ENCOUNTER — APPOINTMENT (OUTPATIENT)
Dept: PLASTIC SURGERY | Facility: CLINIC | Age: 38
End: 2018-09-10

## 2018-09-17 ENCOUNTER — APPOINTMENT (OUTPATIENT)
Dept: HUMAN REPRODUCTION | Facility: CLINIC | Age: 38
End: 2018-09-17
Payer: COMMERCIAL

## 2018-09-17 PROCEDURE — 99215 OFFICE O/P EST HI 40 MIN: CPT

## 2018-09-27 PROBLEM — N97.9 FEMALE INFERTILITY: Status: ACTIVE | Noted: 2018-09-27

## 2018-09-27 RX ORDER — CETRORELIX ACETATE 0.25 MG
0.25 KIT SUBCUTANEOUS
Qty: 6 | Refills: 1 | Status: ACTIVE | COMMUNITY
Start: 2018-09-27 | End: 1900-01-01

## 2018-09-27 RX ORDER — CONTAINER,EMPTY
EACH MISCELLANEOUS
Qty: 1 | Refills: 2 | Status: ACTIVE | COMMUNITY
Start: 2018-09-27 | End: 1900-01-01

## 2018-09-27 RX ORDER — MENOTROPINS 75 UNIT
75 KIT SUBCUTANEOUS
Qty: 20 | Refills: 1 | Status: ACTIVE | COMMUNITY
Start: 2018-09-27 | End: 1900-01-01

## 2018-09-27 RX ORDER — ISOPROPYL ALCOHOL 0.7 ML/ML
SWAB TOPICAL
Qty: 30 | Refills: 0 | Status: ACTIVE | COMMUNITY
Start: 2018-09-27 | End: 1900-01-01

## 2018-09-27 RX ORDER — FOLLITROPIN ALFA 1050 UNIT
1050 KIT SUBCUTANEOUS
Qty: 2 | Refills: 3 | Status: ACTIVE | COMMUNITY
Start: 2018-09-27 | End: 1900-01-01

## 2018-10-03 ENCOUNTER — RESULT REVIEW (OUTPATIENT)
Age: 38
End: 2018-10-03

## 2018-10-03 ENCOUNTER — APPOINTMENT (OUTPATIENT)
Dept: OBGYN | Facility: CLINIC | Age: 38
End: 2018-10-03
Payer: COMMERCIAL

## 2018-10-03 DIAGNOSIS — N97.9 FEMALE INFERTILITY, UNSPECIFIED: ICD-10-CM

## 2018-10-03 PROCEDURE — 99395 PREV VISIT EST AGE 18-39: CPT

## 2018-10-15 RX ORDER — FOLLITROPIN 900 [IU]/1.08ML
900 INJECTION, SOLUTION SUBCUTANEOUS
Qty: 2 | Refills: 2 | Status: ACTIVE | COMMUNITY
Start: 2018-10-15 | End: 1900-01-01

## 2018-10-19 ENCOUNTER — APPOINTMENT (OUTPATIENT)
Dept: DERMATOLOGY | Facility: CLINIC | Age: 38
End: 2018-10-19
Payer: SELF-PAY

## 2018-10-19 VITALS
SYSTOLIC BLOOD PRESSURE: 96 MMHG | HEIGHT: 65 IN | BODY MASS INDEX: 16.33 KG/M2 | DIASTOLIC BLOOD PRESSURE: 60 MMHG | WEIGHT: 98 LBS

## 2018-10-19 PROCEDURE — 99203 OFFICE O/P NEW LOW 30 MIN: CPT

## 2018-10-19 PROCEDURE — D0125: CPT

## 2018-11-05 RX ORDER — CHORIONIC GONADOTROPIN 10000 UNIT
10000 KIT INTRAMUSCULAR
Qty: 1 | Refills: 0 | Status: ACTIVE | COMMUNITY
Start: 2018-09-27 | End: 1900-01-01

## 2018-11-08 ENCOUNTER — RX RENEWAL (OUTPATIENT)
Age: 38
End: 2018-11-08

## 2018-11-08 RX ORDER — NEEDLES, DISPOSABLE 25GX5/8"
27G X 1/2" NEEDLE, DISPOSABLE MISCELLANEOUS
Qty: 15 | Refills: 2 | Status: ACTIVE | COMMUNITY
Start: 2018-09-27 | End: 1900-01-01

## 2018-11-12 ENCOUNTER — OTHER (OUTPATIENT)
Age: 38
End: 2018-11-12

## 2018-11-12 ENCOUNTER — APPOINTMENT (OUTPATIENT)
Dept: HUMAN REPRODUCTION | Facility: CLINIC | Age: 38
End: 2018-11-12
Payer: COMMERCIAL

## 2018-11-12 PROCEDURE — 99213 OFFICE O/P EST LOW 20 MIN: CPT | Mod: 25

## 2018-11-12 PROCEDURE — 76830 TRANSVAGINAL US NON-OB: CPT

## 2018-11-12 PROCEDURE — 36415 COLL VENOUS BLD VENIPUNCTURE: CPT

## 2018-11-20 ENCOUNTER — APPOINTMENT (OUTPATIENT)
Dept: HUMAN REPRODUCTION | Facility: CLINIC | Age: 38
End: 2018-11-20
Payer: COMMERCIAL

## 2018-11-20 PROCEDURE — 76830 TRANSVAGINAL US NON-OB: CPT

## 2018-11-20 PROCEDURE — 99213 OFFICE O/P EST LOW 20 MIN: CPT | Mod: 25

## 2018-11-20 PROCEDURE — 36415 COLL VENOUS BLD VENIPUNCTURE: CPT

## 2018-11-20 PROCEDURE — 58999 UNLISTED PX FML GENITAL SYS: CPT

## 2018-11-29 ENCOUNTER — APPOINTMENT (OUTPATIENT)
Dept: HUMAN REPRODUCTION | Facility: CLINIC | Age: 38
End: 2018-11-29
Payer: COMMERCIAL

## 2018-11-29 PROCEDURE — 76830 TRANSVAGINAL US NON-OB: CPT

## 2018-11-29 PROCEDURE — 99213 OFFICE O/P EST LOW 20 MIN: CPT | Mod: 25

## 2018-11-29 PROCEDURE — 36415 COLL VENOUS BLD VENIPUNCTURE: CPT

## 2018-12-02 ENCOUNTER — APPOINTMENT (OUTPATIENT)
Dept: HUMAN REPRODUCTION | Facility: CLINIC | Age: 38
End: 2018-12-02
Payer: COMMERCIAL

## 2018-12-02 PROCEDURE — 36415 COLL VENOUS BLD VENIPUNCTURE: CPT

## 2018-12-02 PROCEDURE — 76830 TRANSVAGINAL US NON-OB: CPT

## 2018-12-02 PROCEDURE — 99213 OFFICE O/P EST LOW 20 MIN: CPT | Mod: 25

## 2018-12-04 ENCOUNTER — APPOINTMENT (OUTPATIENT)
Dept: HUMAN REPRODUCTION | Facility: CLINIC | Age: 38
End: 2018-12-04
Payer: COMMERCIAL

## 2018-12-04 ENCOUNTER — RX RENEWAL (OUTPATIENT)
Age: 38
End: 2018-12-04

## 2018-12-04 PROCEDURE — 36415 COLL VENOUS BLD VENIPUNCTURE: CPT

## 2018-12-04 PROCEDURE — 99213 OFFICE O/P EST LOW 20 MIN: CPT | Mod: 25

## 2018-12-04 PROCEDURE — 76830 TRANSVAGINAL US NON-OB: CPT

## 2018-12-04 RX ORDER — SYRINGE WITH NEEDLE, 1 ML 25GX5/8"
22G X 1-1/2" SYRINGE, EMPTY DISPOSABLE MISCELLANEOUS
Qty: 20 | Refills: 0 | Status: ACTIVE | COMMUNITY
Start: 2018-09-27 | End: 1900-01-01

## 2018-12-06 ENCOUNTER — APPOINTMENT (OUTPATIENT)
Dept: HUMAN REPRODUCTION | Facility: CLINIC | Age: 38
End: 2018-12-06
Payer: COMMERCIAL

## 2018-12-06 PROCEDURE — 99213 OFFICE O/P EST LOW 20 MIN: CPT | Mod: 25

## 2018-12-06 PROCEDURE — 36415 COLL VENOUS BLD VENIPUNCTURE: CPT

## 2018-12-06 PROCEDURE — 76830 TRANSVAGINAL US NON-OB: CPT

## 2018-12-08 ENCOUNTER — APPOINTMENT (OUTPATIENT)
Dept: HUMAN REPRODUCTION | Facility: CLINIC | Age: 38
End: 2018-12-08
Payer: COMMERCIAL

## 2018-12-08 PROCEDURE — 36415 COLL VENOUS BLD VENIPUNCTURE: CPT

## 2018-12-08 PROCEDURE — 99213 OFFICE O/P EST LOW 20 MIN: CPT | Mod: 25

## 2018-12-08 PROCEDURE — 76830 TRANSVAGINAL US NON-OB: CPT

## 2018-12-09 ENCOUNTER — APPOINTMENT (OUTPATIENT)
Dept: HUMAN REPRODUCTION | Facility: CLINIC | Age: 38
End: 2018-12-09
Payer: COMMERCIAL

## 2018-12-09 PROCEDURE — 76830 TRANSVAGINAL US NON-OB: CPT

## 2018-12-09 PROCEDURE — 99213 OFFICE O/P EST LOW 20 MIN: CPT | Mod: 25

## 2018-12-09 PROCEDURE — 36415 COLL VENOUS BLD VENIPUNCTURE: CPT

## 2018-12-10 ENCOUNTER — APPOINTMENT (OUTPATIENT)
Dept: HUMAN REPRODUCTION | Facility: CLINIC | Age: 38
End: 2018-12-10
Payer: COMMERCIAL

## 2018-12-10 PROCEDURE — 76830 TRANSVAGINAL US NON-OB: CPT

## 2018-12-10 PROCEDURE — 36415 COLL VENOUS BLD VENIPUNCTURE: CPT

## 2018-12-10 PROCEDURE — 99213 OFFICE O/P EST LOW 20 MIN: CPT | Mod: 25

## 2018-12-11 ENCOUNTER — APPOINTMENT (OUTPATIENT)
Dept: HUMAN REPRODUCTION | Facility: CLINIC | Age: 38
End: 2018-12-11
Payer: COMMERCIAL

## 2018-12-11 PROCEDURE — 36415 COLL VENOUS BLD VENIPUNCTURE: CPT

## 2018-12-11 PROCEDURE — 99213 OFFICE O/P EST LOW 20 MIN: CPT | Mod: 25

## 2018-12-11 PROCEDURE — 76830 TRANSVAGINAL US NON-OB: CPT

## 2018-12-12 ENCOUNTER — APPOINTMENT (OUTPATIENT)
Dept: HUMAN REPRODUCTION | Facility: CLINIC | Age: 38
End: 2018-12-12
Payer: COMMERCIAL

## 2018-12-12 PROCEDURE — 76830 TRANSVAGINAL US NON-OB: CPT

## 2018-12-12 PROCEDURE — 36415 COLL VENOUS BLD VENIPUNCTURE: CPT

## 2018-12-12 PROCEDURE — 99213 OFFICE O/P EST LOW 20 MIN: CPT | Mod: 25

## 2018-12-13 ENCOUNTER — APPOINTMENT (OUTPATIENT)
Dept: HUMAN REPRODUCTION | Facility: CLINIC | Age: 38
End: 2018-12-13
Payer: COMMERCIAL

## 2018-12-13 PROCEDURE — 76948 ECHO GUIDE OVA ASPIRATION: CPT

## 2018-12-13 PROCEDURE — 89250 CULTR OOCYTE/EMBRYO <4 DAYS: CPT

## 2018-12-13 PROCEDURE — 58970 RETRIEVAL OF OOCYTE: CPT

## 2018-12-13 PROCEDURE — 89254 OOCYTE IDENTIFICATION: CPT

## 2018-12-18 ENCOUNTER — EMERGENCY (EMERGENCY)
Facility: HOSPITAL | Age: 38
LOS: 1 days | Discharge: HOME | End: 2018-12-18
Attending: STUDENT IN AN ORGANIZED HEALTH CARE EDUCATION/TRAINING PROGRAM
Payer: COMMERCIAL

## 2018-12-18 VITALS
HEIGHT: 65 IN | HEART RATE: 104 BPM | RESPIRATION RATE: 16 BRPM | WEIGHT: 100.97 LBS | SYSTOLIC BLOOD PRESSURE: 113 MMHG | OXYGEN SATURATION: 98 % | TEMPERATURE: 98 F | DIASTOLIC BLOOD PRESSURE: 76 MMHG

## 2018-12-18 PROCEDURE — 99285 EMERGENCY DEPT VISIT HI MDM: CPT | Mod: 25

## 2018-12-19 VITALS
RESPIRATION RATE: 16 BRPM | DIASTOLIC BLOOD PRESSURE: 68 MMHG | SYSTOLIC BLOOD PRESSURE: 98 MMHG | HEART RATE: 64 BPM | OXYGEN SATURATION: 100 %

## 2018-12-19 DIAGNOSIS — M32.9 SYSTEMIC LUPUS ERYTHEMATOSUS, UNSPECIFIED: ICD-10-CM

## 2018-12-19 LAB
ALBUMIN SERPL ELPH-MCNC: 3 G/DL — LOW (ref 3.3–5)
ALP SERPL-CCNC: 49 U/L — SIGNIFICANT CHANGE UP (ref 40–120)
ALT FLD-CCNC: 10 U/L — SIGNIFICANT CHANGE UP (ref 10–45)
ANION GAP SERPL CALC-SCNC: 11 MMOL/L — SIGNIFICANT CHANGE UP (ref 5–17)
APPEARANCE UR: CLEAR — SIGNIFICANT CHANGE UP
AST SERPL-CCNC: 10 U/L — SIGNIFICANT CHANGE UP (ref 10–40)
BASOPHILS # BLD AUTO: 0 K/UL — SIGNIFICANT CHANGE UP (ref 0–0.2)
BASOPHILS NFR BLD AUTO: 0.1 % — SIGNIFICANT CHANGE UP (ref 0–2)
BILIRUB SERPL-MCNC: 0.2 MG/DL — SIGNIFICANT CHANGE UP (ref 0.2–1.2)
BILIRUB UR-MCNC: NEGATIVE — SIGNIFICANT CHANGE UP
BUN SERPL-MCNC: 8 MG/DL — SIGNIFICANT CHANGE UP (ref 7–23)
CALCIUM SERPL-MCNC: 7.6 MG/DL — LOW (ref 8.4–10.5)
CHLORIDE SERPL-SCNC: 105 MMOL/L — SIGNIFICANT CHANGE UP (ref 96–108)
CO2 SERPL-SCNC: 22 MMOL/L — SIGNIFICANT CHANGE UP (ref 22–31)
COLOR SPEC: SIGNIFICANT CHANGE UP
CREAT SERPL-MCNC: 0.67 MG/DL — SIGNIFICANT CHANGE UP (ref 0.5–1.3)
DIFF PNL FLD: NEGATIVE — SIGNIFICANT CHANGE UP
EOSINOPHIL # BLD AUTO: 0 K/UL — SIGNIFICANT CHANGE UP (ref 0–0.5)
EOSINOPHIL NFR BLD AUTO: 0.2 % — SIGNIFICANT CHANGE UP (ref 0–6)
GAS PNL BLDV: SIGNIFICANT CHANGE UP
GLUCOSE SERPL-MCNC: 120 MG/DL — HIGH (ref 70–99)
GLUCOSE UR QL: NEGATIVE — SIGNIFICANT CHANGE UP
HCG SERPL-ACNC: 47.9 MIU/ML — HIGH
HCT VFR BLD CALC: 36.8 % — SIGNIFICANT CHANGE UP (ref 34.5–45)
HGB BLD-MCNC: 12.5 G/DL — SIGNIFICANT CHANGE UP (ref 11.5–15.5)
KETONES UR-MCNC: ABNORMAL
LEUKOCYTE ESTERASE UR-ACNC: NEGATIVE — SIGNIFICANT CHANGE UP
LIDOCAIN IGE QN: 43 U/L — SIGNIFICANT CHANGE UP (ref 7–60)
LYMPHOCYTES # BLD AUTO: 1.1 K/UL — SIGNIFICANT CHANGE UP (ref 1–3.3)
LYMPHOCYTES # BLD AUTO: 12.7 % — LOW (ref 13–44)
MCHC RBC-ENTMCNC: 29.9 PG — SIGNIFICANT CHANGE UP (ref 27–34)
MCHC RBC-ENTMCNC: 33.8 GM/DL — SIGNIFICANT CHANGE UP (ref 32–36)
MCV RBC AUTO: 88.5 FL — SIGNIFICANT CHANGE UP (ref 80–100)
MONOCYTES # BLD AUTO: 0.2 K/UL — SIGNIFICANT CHANGE UP (ref 0–0.9)
MONOCYTES NFR BLD AUTO: 1.8 % — LOW (ref 2–14)
NEUTROPHILS # BLD AUTO: 7.4 K/UL — SIGNIFICANT CHANGE UP (ref 1.8–7.4)
NEUTROPHILS NFR BLD AUTO: 85.2 % — HIGH (ref 43–77)
NITRITE UR-MCNC: NEGATIVE — SIGNIFICANT CHANGE UP
PH UR: 7 — SIGNIFICANT CHANGE UP (ref 5–8)
PLATELET # BLD AUTO: 201 K/UL — SIGNIFICANT CHANGE UP (ref 150–400)
POTASSIUM SERPL-MCNC: 4.5 MMOL/L — SIGNIFICANT CHANGE UP (ref 3.5–5.3)
POTASSIUM SERPL-SCNC: 4.5 MMOL/L — SIGNIFICANT CHANGE UP (ref 3.5–5.3)
PROT SERPL-MCNC: 6.2 G/DL — SIGNIFICANT CHANGE UP (ref 6–8.3)
PROT UR-MCNC: ABNORMAL
RBC # BLD: 4.16 M/UL — SIGNIFICANT CHANGE UP (ref 3.8–5.2)
RBC # FLD: 11.6 % — SIGNIFICANT CHANGE UP (ref 10.3–14.5)
SODIUM SERPL-SCNC: 138 MMOL/L — SIGNIFICANT CHANGE UP (ref 135–145)
SP GR SPEC: 1.02 — SIGNIFICANT CHANGE UP (ref 1.01–1.02)
UROBILINOGEN FLD QL: NEGATIVE — SIGNIFICANT CHANGE UP
WBC # BLD: 8.6 K/UL — SIGNIFICANT CHANGE UP (ref 3.8–10.5)
WBC # FLD AUTO: 8.6 K/UL — SIGNIFICANT CHANGE UP (ref 3.8–10.5)

## 2018-12-19 PROCEDURE — 93975 VASCULAR STUDY: CPT

## 2018-12-19 PROCEDURE — 82947 ASSAY GLUCOSE BLOOD QUANT: CPT

## 2018-12-19 PROCEDURE — 84295 ASSAY OF SERUM SODIUM: CPT

## 2018-12-19 PROCEDURE — 83690 ASSAY OF LIPASE: CPT

## 2018-12-19 PROCEDURE — 87086 URINE CULTURE/COLONY COUNT: CPT

## 2018-12-19 PROCEDURE — 82330 ASSAY OF CALCIUM: CPT

## 2018-12-19 PROCEDURE — 83605 ASSAY OF LACTIC ACID: CPT

## 2018-12-19 PROCEDURE — 85014 HEMATOCRIT: CPT

## 2018-12-19 PROCEDURE — 80053 COMPREHEN METABOLIC PANEL: CPT

## 2018-12-19 PROCEDURE — 96365 THER/PROPH/DIAG IV INF INIT: CPT

## 2018-12-19 PROCEDURE — 84132 ASSAY OF SERUM POTASSIUM: CPT

## 2018-12-19 PROCEDURE — 96376 TX/PRO/DX INJ SAME DRUG ADON: CPT

## 2018-12-19 PROCEDURE — 84100 ASSAY OF PHOSPHORUS: CPT

## 2018-12-19 PROCEDURE — 93975 VASCULAR STUDY: CPT | Mod: 26

## 2018-12-19 PROCEDURE — 85027 COMPLETE CBC AUTOMATED: CPT

## 2018-12-19 PROCEDURE — 81001 URINALYSIS AUTO W/SCOPE: CPT

## 2018-12-19 PROCEDURE — 96361 HYDRATE IV INFUSION ADD-ON: CPT

## 2018-12-19 PROCEDURE — 82803 BLOOD GASES ANY COMBINATION: CPT

## 2018-12-19 PROCEDURE — 76830 TRANSVAGINAL US NON-OB: CPT

## 2018-12-19 PROCEDURE — 76830 TRANSVAGINAL US NON-OB: CPT | Mod: 26

## 2018-12-19 PROCEDURE — 96375 TX/PRO/DX INJ NEW DRUG ADDON: CPT

## 2018-12-19 PROCEDURE — 85652 RBC SED RATE AUTOMATED: CPT

## 2018-12-19 PROCEDURE — 99284 EMERGENCY DEPT VISIT MOD MDM: CPT | Mod: 25

## 2018-12-19 PROCEDURE — 84702 CHORIONIC GONADOTROPIN TEST: CPT

## 2018-12-19 PROCEDURE — 82435 ASSAY OF BLOOD CHLORIDE: CPT

## 2018-12-19 RX ORDER — ONDANSETRON 8 MG/1
4 TABLET, FILM COATED ORAL ONCE
Qty: 0 | Refills: 0 | Status: COMPLETED | OUTPATIENT
Start: 2018-12-19 | End: 2018-12-19

## 2018-12-19 RX ORDER — SODIUM CHLORIDE 9 MG/ML
1000 INJECTION INTRAMUSCULAR; INTRAVENOUS; SUBCUTANEOUS ONCE
Qty: 0 | Refills: 0 | Status: COMPLETED | OUTPATIENT
Start: 2018-12-19 | End: 2018-12-19

## 2018-12-19 RX ORDER — ACETAMINOPHEN 500 MG
650 TABLET ORAL ONCE
Qty: 0 | Refills: 0 | Status: COMPLETED | OUTPATIENT
Start: 2018-12-19 | End: 2018-12-19

## 2018-12-19 RX ORDER — HYDROMORPHONE HYDROCHLORIDE 2 MG/ML
1 INJECTION INTRAMUSCULAR; INTRAVENOUS; SUBCUTANEOUS ONCE
Qty: 0 | Refills: 0 | Status: DISCONTINUED | OUTPATIENT
Start: 2018-12-19 | End: 2018-12-19

## 2018-12-19 RX ORDER — FAMOTIDINE 10 MG/ML
20 INJECTION INTRAVENOUS ONCE
Qty: 0 | Refills: 0 | Status: COMPLETED | OUTPATIENT
Start: 2018-12-19 | End: 2018-12-19

## 2018-12-19 RX ORDER — FAMOTIDINE 10 MG/ML
20 INJECTION INTRAVENOUS ONCE
Qty: 0 | Refills: 0 | Status: DISCONTINUED | OUTPATIENT
Start: 2018-12-19 | End: 2018-12-19

## 2018-12-19 RX ADMIN — SODIUM CHLORIDE 1000 MILLILITER(S): 9 INJECTION INTRAMUSCULAR; INTRAVENOUS; SUBCUTANEOUS at 01:14

## 2018-12-19 RX ADMIN — Medication 650 MILLIGRAM(S): at 04:26

## 2018-12-19 RX ADMIN — Medication 60 MILLIGRAM(S): at 08:40

## 2018-12-19 RX ADMIN — Medication 125 MILLIGRAM(S): at 02:09

## 2018-12-19 RX ADMIN — Medication 650 MILLIGRAM(S): at 02:10

## 2018-12-19 RX ADMIN — Medication 260 MILLIGRAM(S): at 01:21

## 2018-12-19 RX ADMIN — FAMOTIDINE 20 MILLIGRAM(S): 10 INJECTION INTRAVENOUS at 02:27

## 2018-12-19 RX ADMIN — SODIUM CHLORIDE 1000 MILLILITER(S): 9 INJECTION INTRAMUSCULAR; INTRAVENOUS; SUBCUTANEOUS at 04:26

## 2018-12-19 RX ADMIN — HYDROMORPHONE HYDROCHLORIDE 1 MILLIGRAM(S): 2 INJECTION INTRAMUSCULAR; INTRAVENOUS; SUBCUTANEOUS at 04:26

## 2018-12-19 RX ADMIN — ONDANSETRON 4 MILLIGRAM(S): 8 TABLET, FILM COATED ORAL at 02:07

## 2018-12-19 RX ADMIN — HYDROMORPHONE HYDROCHLORIDE 1 MILLIGRAM(S): 2 INJECTION INTRAMUSCULAR; INTRAVENOUS; SUBCUTANEOUS at 02:04

## 2018-12-19 RX ADMIN — SODIUM CHLORIDE 1000 MILLILITER(S): 9 INJECTION INTRAMUSCULAR; INTRAVENOUS; SUBCUTANEOUS at 04:24

## 2018-12-19 NOTE — ED PROVIDER NOTE - CARE PROVIDER_API CALL
Goldberg, Avram Z (MD), Internal Medicine; Rheumatology  [address] [facility] [address]  [place] [address]  Phone: [phone]  Fax: [phone]

## 2018-12-19 NOTE — ED ADULT NURSE REASSESSMENT NOTE - NS ED NURSE REASSESS COMMENT FT1
0705 Report received from night nurse Carlos Manuel Cline RN. Pt feels better. A&OX4. No c/o pain. IVL intact x 2 without sx of infilt

## 2018-12-19 NOTE — CONSULT NOTE ADULT - SUBJECTIVE AND OBJECTIVE BOX
39 y/o  w/ hx of Lupus going through IVF s/p egg retrieval on  presenting to ED with abdominal pain. LMP . Pt reports that yesterday she started feeling nausea abdominal pain and started vomiting. She recognized this as being similar to past GI lupus flares of her so she came to the ED. She received solumedrol in the ED and already reports that her nausea and abdominal pain is better. Pt has been unsuccessful in getting pregnant after trying the past few years so has been going through IVF. She had her bHCG trigger shot on  and an egg retrieval  on . Her MICHAELLE is Dr. Hurtado, her OB is Dr. Enrico Ruiz. She had some spotting after the egg retrieveal but denies VB, discharge at this time.  OBHx: 1x , 1x SAB  GynHx: Infertility  PMH: Lupus  SHx: Denies  All: NKDA  Meds: Prednisone 10 mg daily    VS  T(C): 36.5 (18 @ 06:46)  HR: 72 (18 @ 06:46)  BP: 95/76 (18 @ 06:46)  RR: 18 (18 @ 06:46)  SpO2: 100% (18 @ 06:46)    Physical Exam:  General: NAD  Abdomen: Soft, non-tender, non-distended  Pelvic: uterus not enlarged, b/l adnexal enlargement, symmetrical. Minimal tenderness throughout bimanual exam               12.5   8.6   )-----------( 201      (  @ 03:21 )             36.8     :21    138  |  105  |  8   ----------------------------<  120  4.5   |  22  |  0.67    Ca    7.6       03:21  Phos  3.5     :21    TPro  6.2  /  Alb  3.0  /  TBili  0.2  /  DBili  x   /  AST  10  /  ALT  10  /  AlkPhos  49   @ 03:21    HCG Quantitative, Serum (18 @ 03:21)    HCG Quantitative, Serum: 47.9    Was at bedside when ultrasound was being performed. Uterus was empty and wnl, ovaries noted to be enlarged with multiple follicles b/l. Flow noted b/l. Full report from radiology to follow.

## 2018-12-19 NOTE — ED ADULT NURSE REASSESSMENT NOTE - NS ED NURSE REASSESS COMMENT FT1
Pt a&o x3, resting on stretcher, reporting no more nausea. Denies weakness, dizziness, chest pains, or SOB at this time. Dr Young notified of BP. Holding Dilaudid 1mg until further orders. Pt a&o x3, resting on stretcher, reporting no more nausea. Denies weakness, dizziness, chest pains, or SOB at this time. Dr Carmen notified of BP. Holding Dilaudid 1mg until further orders.    Dr Carmen authorizes administration of Dilaudid 1mg. Medicating as per order.

## 2018-12-19 NOTE — ED PROVIDER NOTE - MEDICAL DECISION MAKING DETAILS
38F female presenting with abdominal pain likely 2/2 lupus flare. plan for cbc,cmp, lipase, ct abdomen and pelvis. will reassess.

## 2018-12-19 NOTE — ED PROVIDER NOTE - PHYSICAL EXAMINATION
General: uncomfortable appearing female, no acute distress   HEENT: normocephalic, atraumatic   Respiratory: normal work of breathing, lungs clear to auscultation bilaterally   Cardiac: regular rate and rhythm   Abdomen: soft, diffuse tenderness to palpation   MSK: no swelling or tenderness of lower extremities   Neuro: A&Ox3

## 2018-12-19 NOTE — ED ADULT NURSE NOTE - OBJECTIVE STATEMENT
37 y/o female, a&o x3, c/o lupus flare presenting as epigastric abd pain and nausea with 2 episodes of vomiting x1 hours. Denies headache, weakness, dizziness, fevers, chills, or chest pains. Breathing even, full, unlabored, no cough, no SOB. Abdomen soft, pain of epigastrium, +nausea, +vomiting, no constipation/diarrhea/urinary complaints. skin warm/dry/intact, no edema. Stretcher locked in low position. Advised of plan of care. Family at bedside.

## 2018-12-19 NOTE — ED PROVIDER NOTE - PROGRESS NOTE DETAILS
called back [facility] answering service for Dr Goldberg as haven't heard back ~45-1hr spoke with Dr Goldberg bhcg + 43. pt pt was just on regimen for egg harvest for IVF  with current +bhcg. will call ob for further rec. ms bowman is a pt of dr scott. called by OB resident. pt received bhcg shot as part of recent fertility retrieval. states that's cause for increased bhg. also reports reviewed US and typical of recent fertility retrieval as well. Kwesi Young DO: spoke with Dr Goldberg agrees that symptoms sound typical of sle flair. rec steroids as earlier as possible. solumderol 125mg  and 60- 80mg after that. Kwesi Young DO: bhcg + 43. pt pt was just on regimen for egg harvest for IVF  with current +bhcg. will call ob for further rec. ms bowman is a pt of dr scott. will hold off on CT. Kwesi Young DO: called by OB resident. pt received bhcg shot as part of recent fertility retrieval. states that's cause for increased bhg. also reports reviewed US and typical of recent fertility retrieval as well. Kwesi Young DO: patient without pain for several hours. abd non-tender on several re-evals. feels well. pt would like to be DC with hope of outpt steroid infusion. Dr Goldberg paged to discussed further. Batsheva: Patient signed out to f/u return call from Dr. Goldberg. Dr. Goldberg agrees with plan for patient to f/u outpatient for solumedrol infusions. recommend additional 60 mg IV dose in ER and discharge. Patient agreeable and prefer to go home at this time. Patient stable with no abdominal pain. will d/c home.

## 2018-12-19 NOTE — CONSULT NOTE ADULT - PROBLEM SELECTOR RECOMMENDATION 9
- No acute gyn intervention  - Treatment of lupus flare as per ED    d/w Dr. Tanya Loving, PGY-3  Pager #[phone]

## 2018-12-19 NOTE — CONSULT NOTE ADULT - ASSESSMENT
42 y/o  with Lupus s/p egg retrieval on  with likely lupus flare given consistent symptoms with previous flares and improvement with solumedrol. +bHCG is likely from bHCG trigger injection on , true spontaneous pregnancy highly unlikely given pt's infertility, and no embryo has been implanted by MICHAELLE yet. Enlargement of ovaries with cysts normal as part of egg retrieval process, no concern for torsion at this time, free fluid on ultrasound also consistent with ovulation induction.

## 2018-12-19 NOTE — ED ADULT NURSE REASSESSMENT NOTE - NS ED NURSE REASSESS COMMENT FT1
Pt a&o x3, resting on stretcher, in no acute distress. No pain or nausea at this time. Returned from US. Awaiting results, further orders, and dispo.

## 2018-12-19 NOTE — ED PROVIDER NOTE - ATTENDING CONTRIBUTION TO CARE
GEN: no acute respiratory distress. nontoxic, speaking comfortably in full sentences,in pain  HEENT: NCAT. face symmetrical. PERRL 4mm, EOMI, MMM, oropharynx wnl.  Neck: no JVD, trachea midline, no LAD  CV: RRR. +S1S2, no murmur. 2+ pulses in 4 extremities  Chest: CTA B/l. no wheezing, rales, rhonchi. no retractions. good air movement.   ABD: +BS, soft, non distended, +epigastric ttp. +voluntary gaurding. No rebound. No lesions, ecchymosis, surgical scar  : no cva or suprapubic tenderness  MSK: No clubbing, cyanosis, edema. FROM of all extremities. no tenderness to palpation. No midline or paraspinal tenderness.   Neuro: AOOX3. motor 5/5 throughout.   SKIN: No erythema, lesions or rash    SLE with abd pain, n,v similar to past flairs. labs, symptom relief. will contact rheum GEN: no acute respiratory distress. nontoxic, speaking comfortably in full sentences, in pain  HEENT: NCAT. face symmetrical. PERRL 4mm, EOMI, MMM, oropharynx wnl.  Neck: no JVD, trachea midline, no LAD  CV: RRR. +S1S2, no murmur. 2+ pulses in 4 extremities  Chest: CTA B/l. no wheezing, rales, rhonchi. no retractions. good air movement.   ABD: +BS, soft, non distended, +epigastric ttp. no lower abd pain. +voluntary guarding. No rebound. No lesions, ecchymosis, surgical scar  : no cva or suprapubic tenderness  MSK: No clubbing, cyanosis, edema. FROM of all extremities. no tenderness to palpation. No midline or paraspinal tenderness.   Neuro: AOOX3. motor 5/5 throughout.   SKIN: No erythema, lesions or rash    SLE with abd pain, n,v similar to past flairs. labs, symptom relief. will contact rheum

## 2018-12-19 NOTE — ED ADULT NURSE NOTE - NSIMPLEMENTINTERV_GEN_ALL_ED
Implemented All Universal Safety Interventions:  [place] to call system. Call bell, personal items and telephone within reach. Instruct patient to call for assistance. Room bathroom lighting operational. Non-slip footwear when patient is off stretcher. Physically safe environment: no spills, clutter or unnecessary equipment. Stretcher in lowest position, wheels locked, appropriate side rails in place.

## 2018-12-20 ENCOUNTER — APPOINTMENT (OUTPATIENT)
Dept: HUMAN REPRODUCTION | Facility: CLINIC | Age: 38
End: 2018-12-20
Payer: COMMERCIAL

## 2018-12-20 LAB
CULTURE RESULTS: NO GROWTH — SIGNIFICANT CHANGE UP
SPECIMEN SOURCE: SIGNIFICANT CHANGE UP

## 2018-12-20 PROCEDURE — 76830 TRANSVAGINAL US NON-OB: CPT

## 2018-12-20 PROCEDURE — 99213 OFFICE O/P EST LOW 20 MIN: CPT | Mod: 25

## 2019-01-22 ENCOUNTER — APPOINTMENT (OUTPATIENT)
Dept: HUMAN REPRODUCTION | Facility: CLINIC | Age: 39
End: 2019-01-22
Payer: COMMERCIAL

## 2019-01-22 PROCEDURE — 99214 OFFICE O/P EST MOD 30 MIN: CPT

## 2019-02-28 ENCOUNTER — APPOINTMENT (OUTPATIENT)
Dept: HUMAN REPRODUCTION | Facility: CLINIC | Age: 39
End: 2019-02-28
Payer: COMMERCIAL

## 2019-02-28 PROCEDURE — 36415 COLL VENOUS BLD VENIPUNCTURE: CPT

## 2019-04-26 ENCOUNTER — APPOINTMENT (OUTPATIENT)
Dept: HUMAN REPRODUCTION | Facility: CLINIC | Age: 39
End: 2019-04-26
Payer: COMMERCIAL

## 2019-04-26 PROCEDURE — 76830 TRANSVAGINAL US NON-OB: CPT

## 2019-04-26 PROCEDURE — 99213 OFFICE O/P EST LOW 20 MIN: CPT | Mod: 25

## 2019-05-03 ENCOUNTER — APPOINTMENT (OUTPATIENT)
Dept: HUMAN REPRODUCTION | Facility: CLINIC | Age: 39
End: 2019-05-03
Payer: COMMERCIAL

## 2019-05-03 PROCEDURE — 99213 OFFICE O/P EST LOW 20 MIN: CPT | Mod: 25

## 2019-05-03 PROCEDURE — 76830 TRANSVAGINAL US NON-OB: CPT

## 2019-05-07 ENCOUNTER — APPOINTMENT (OUTPATIENT)
Dept: HUMAN REPRODUCTION | Facility: CLINIC | Age: 39
End: 2019-05-07
Payer: COMMERCIAL

## 2019-05-07 ENCOUNTER — APPOINTMENT (OUTPATIENT)
Dept: HUMAN REPRODUCTION | Facility: CLINIC | Age: 39
End: 2019-05-07

## 2019-05-07 PROCEDURE — 76830 TRANSVAGINAL US NON-OB: CPT

## 2019-05-07 PROCEDURE — 58322 ARTIFICIAL INSEMINATION: CPT

## 2019-05-07 PROCEDURE — 99213 OFFICE O/P EST LOW 20 MIN: CPT | Mod: 25

## 2019-05-07 PROCEDURE — 36415 COLL VENOUS BLD VENIPUNCTURE: CPT

## 2019-05-07 RX ORDER — CHORIOGONADOTROPIN ALFA 250 UG/.5ML
250 INJECTION, SOLUTION SUBCUTANEOUS
Qty: 1 | Refills: 1 | Status: ACTIVE | COMMUNITY
Start: 2019-05-07 | End: 1900-01-01

## 2019-05-09 ENCOUNTER — APPOINTMENT (OUTPATIENT)
Dept: HUMAN REPRODUCTION | Facility: CLINIC | Age: 39
End: 2019-05-09
Payer: COMMERCIAL

## 2019-05-09 ENCOUNTER — APPOINTMENT (OUTPATIENT)
Dept: HUMAN REPRODUCTION | Facility: CLINIC | Age: 39
End: 2019-05-09

## 2019-05-09 PROCEDURE — 89261 SPERM ISOLATION COMPLEX: CPT

## 2019-05-09 PROCEDURE — 58322 ARTIFICIAL INSEMINATION: CPT

## 2019-05-09 PROCEDURE — 99213 OFFICE O/P EST LOW 20 MIN: CPT | Mod: 25

## 2019-05-09 PROCEDURE — 76830 TRANSVAGINAL US NON-OB: CPT

## 2019-05-17 ENCOUNTER — INPATIENT (INPATIENT)
Facility: HOSPITAL | Age: 39
LOS: 1 days | Discharge: HOME | DRG: 776 | End: 2019-05-19
Attending: INTERNAL MEDICINE | Admitting: INTERNAL MEDICINE
Payer: COMMERCIAL

## 2019-05-17 VITALS
DIASTOLIC BLOOD PRESSURE: 84 MMHG | HEART RATE: 80 BPM | OXYGEN SATURATION: 99 % | WEIGHT: 100.09 LBS | RESPIRATION RATE: 18 BRPM | SYSTOLIC BLOOD PRESSURE: 119 MMHG | HEIGHT: 65 IN | TEMPERATURE: 98 F

## 2019-05-17 PROCEDURE — 99285 EMERGENCY DEPT VISIT HI MDM: CPT | Mod: 25

## 2019-05-17 NOTE — PATIENT PROFILE ADULT. - NS PRO OT REFERRAL QUES 2 YN
----- Message from Brandi Guthrie sent at 2019  3:45 PM EDT -----  Regarding: Dr. Isaac Whaley from [facility] is calling about a prior authorization that  for the Pt before she was actually schedule so she was looking for a authorization to be updated for the Pt. Best contact number for the  Pre authroization department is [phone] and [facility] is [phone]. no

## 2019-05-18 DIAGNOSIS — M32.19 OTHER ORGAN OR SYSTEM INVOLVEMENT IN SYSTEMIC LUPUS ERYTHEMATOSUS: ICD-10-CM

## 2019-05-18 DIAGNOSIS — Z34.90 ENCOUNTER FOR SUPERVISION OF NORMAL PREGNANCY, UNSPECIFIED, UNSPECIFIED TRIMESTER: ICD-10-CM

## 2019-05-18 DIAGNOSIS — K52.9 NONINFECTIVE GASTROENTERITIS AND COLITIS, UNSPECIFIED: ICD-10-CM

## 2019-05-18 DIAGNOSIS — I73.00 RAYNAUD'S SYNDROME WITHOUT GANGRENE: ICD-10-CM

## 2019-05-18 DIAGNOSIS — Z3A.01 LESS THAN 8 WEEKS GESTATION OF PREGNANCY: ICD-10-CM

## 2019-05-18 DIAGNOSIS — R10.13 EPIGASTRIC PAIN: ICD-10-CM

## 2019-05-18 LAB
ALBUMIN SERPL ELPH-MCNC: 3.5 G/DL — SIGNIFICANT CHANGE UP (ref 3.3–5)
ALP SERPL-CCNC: 47 U/L — SIGNIFICANT CHANGE UP (ref 40–120)
ALT FLD-CCNC: 9 U/L — LOW (ref 10–45)
ANION GAP SERPL CALC-SCNC: 12 MMOL/L — SIGNIFICANT CHANGE UP (ref 5–17)
AST SERPL-CCNC: 19 U/L — SIGNIFICANT CHANGE UP (ref 10–40)
BASOPHILS # BLD AUTO: 0 K/UL — SIGNIFICANT CHANGE UP (ref 0–0.2)
BASOPHILS NFR BLD AUTO: 0.1 % — SIGNIFICANT CHANGE UP (ref 0–2)
BILIRUB SERPL-MCNC: 0.2 MG/DL — SIGNIFICANT CHANGE UP (ref 0.2–1.2)
BUN SERPL-MCNC: 13 MG/DL — SIGNIFICANT CHANGE UP (ref 7–23)
CALCIUM SERPL-MCNC: 9.1 MG/DL — SIGNIFICANT CHANGE UP (ref 8.4–10.5)
CHLORIDE SERPL-SCNC: 103 MMOL/L — SIGNIFICANT CHANGE UP (ref 96–108)
CO2 SERPL-SCNC: 24 MMOL/L — SIGNIFICANT CHANGE UP (ref 22–31)
CREAT SERPL-MCNC: 0.7 MG/DL — SIGNIFICANT CHANGE UP (ref 0.5–1.3)
EOSINOPHIL # BLD AUTO: 0 K/UL — SIGNIFICANT CHANGE UP (ref 0–0.5)
EOSINOPHIL NFR BLD AUTO: 0.2 % — SIGNIFICANT CHANGE UP (ref 0–6)
GLUCOSE SERPL-MCNC: 127 MG/DL — HIGH (ref 70–99)
HCG SERPL-ACNC: 13.3 MIU/ML — HIGH
HCG UR QL: NEGATIVE — SIGNIFICANT CHANGE UP
HCT VFR BLD CALC: 38.6 % — SIGNIFICANT CHANGE UP (ref 34.5–45)
HGB BLD-MCNC: 12.9 G/DL — SIGNIFICANT CHANGE UP (ref 11.5–15.5)
LIDOCAIN IGE QN: 40 U/L — SIGNIFICANT CHANGE UP (ref 7–60)
LYMPHOCYTES # BLD AUTO: 0.9 K/UL — LOW (ref 1–3.3)
LYMPHOCYTES # BLD AUTO: 12.3 % — LOW (ref 13–44)
MCHC RBC-ENTMCNC: 29.1 PG — SIGNIFICANT CHANGE UP (ref 27–34)
MCHC RBC-ENTMCNC: 33.3 GM/DL — SIGNIFICANT CHANGE UP (ref 32–36)
MCV RBC AUTO: 87.4 FL — SIGNIFICANT CHANGE UP (ref 80–100)
MONOCYTES # BLD AUTO: 0.1 K/UL — SIGNIFICANT CHANGE UP (ref 0–0.9)
MONOCYTES NFR BLD AUTO: 1.6 % — LOW (ref 2–14)
NEUTROPHILS # BLD AUTO: 6.4 K/UL — SIGNIFICANT CHANGE UP (ref 1.8–7.4)
NEUTROPHILS NFR BLD AUTO: 85.7 % — HIGH (ref 43–77)
PLATELET # BLD AUTO: 192 K/UL — SIGNIFICANT CHANGE UP (ref 150–400)
POTASSIUM SERPL-MCNC: 4.1 MMOL/L — SIGNIFICANT CHANGE UP (ref 3.5–5.3)
POTASSIUM SERPL-SCNC: 4.1 MMOL/L — SIGNIFICANT CHANGE UP (ref 3.5–5.3)
PROGEST SERPL-MCNC: 6.1 NG/ML — SIGNIFICANT CHANGE UP
PROT SERPL-MCNC: 6.5 G/DL — SIGNIFICANT CHANGE UP (ref 6–8.3)
RBC # BLD: 4.42 M/UL — SIGNIFICANT CHANGE UP (ref 3.8–5.2)
RBC # FLD: 11.1 % — SIGNIFICANT CHANGE UP (ref 10.3–14.5)
SODIUM SERPL-SCNC: 139 MMOL/L — SIGNIFICANT CHANGE UP (ref 135–145)
WBC # BLD: 7.5 K/UL — SIGNIFICANT CHANGE UP (ref 3.8–10.5)
WBC # FLD AUTO: 7.5 K/UL — SIGNIFICANT CHANGE UP (ref 3.8–10.5)

## 2019-05-18 PROCEDURE — 76856 US EXAM PELVIC COMPLETE: CPT | Mod: 26

## 2019-05-18 PROCEDURE — 99223 1ST HOSP IP/OBS HIGH 75: CPT

## 2019-05-18 PROCEDURE — 76830 TRANSVAGINAL US NON-OB: CPT | Mod: 26

## 2019-05-18 RX ORDER — METOCLOPRAMIDE HCL 10 MG
10 TABLET ORAL ONCE
Refills: 0 | Status: COMPLETED | OUTPATIENT
Start: 2019-05-18 | End: 2019-05-18

## 2019-05-18 RX ORDER — ONDANSETRON 8 MG/1
4 TABLET, FILM COATED ORAL ONCE
Refills: 0 | Status: COMPLETED | OUTPATIENT
Start: 2019-05-18 | End: 2019-05-18

## 2019-05-18 RX ORDER — CETIRIZINE HYDROCHLORIDE 10 MG/1
1 TABLET ORAL
Qty: 0 | Refills: 0 | DISCHARGE

## 2019-05-18 RX ORDER — HYDROMORPHONE HYDROCHLORIDE 2 MG/ML
0.5 INJECTION INTRAMUSCULAR; INTRAVENOUS; SUBCUTANEOUS ONCE
Refills: 0 | Status: DISCONTINUED | OUTPATIENT
Start: 2019-05-18 | End: 2019-05-18

## 2019-05-18 RX ORDER — ACETAMINOPHEN 500 MG
650 TABLET ORAL EVERY 6 HOURS
Refills: 0 | Status: DISCONTINUED | OUTPATIENT
Start: 2019-05-18 | End: 2019-05-19

## 2019-05-18 RX ORDER — HYDROMORPHONE HYDROCHLORIDE 2 MG/ML
1 INJECTION INTRAMUSCULAR; INTRAVENOUS; SUBCUTANEOUS
Qty: 0 | Refills: 0 | DISCHARGE

## 2019-05-18 RX ADMIN — HYDROMORPHONE HYDROCHLORIDE 0.5 MILLIGRAM(S): 2 INJECTION INTRAMUSCULAR; INTRAVENOUS; SUBCUTANEOUS at 07:03

## 2019-05-18 RX ADMIN — Medication 100 MILLIGRAM(S): at 04:16

## 2019-05-18 RX ADMIN — HYDROMORPHONE HYDROCHLORIDE 0.5 MILLIGRAM(S): 2 INJECTION INTRAMUSCULAR; INTRAVENOUS; SUBCUTANEOUS at 02:30

## 2019-05-18 RX ADMIN — Medication 10 MILLIGRAM(S): at 05:03

## 2019-05-18 RX ADMIN — HYDROMORPHONE HYDROCHLORIDE 0.5 MILLIGRAM(S): 2 INJECTION INTRAMUSCULAR; INTRAVENOUS; SUBCUTANEOUS at 11:19

## 2019-05-18 RX ADMIN — HYDROMORPHONE HYDROCHLORIDE 0.5 MILLIGRAM(S): 2 INJECTION INTRAMUSCULAR; INTRAVENOUS; SUBCUTANEOUS at 01:48

## 2019-05-18 RX ADMIN — HYDROMORPHONE HYDROCHLORIDE 0.5 MILLIGRAM(S): 2 INJECTION INTRAMUSCULAR; INTRAVENOUS; SUBCUTANEOUS at 05:05

## 2019-05-18 RX ADMIN — ONDANSETRON 4 MILLIGRAM(S): 8 TABLET, FILM COATED ORAL at 01:48

## 2019-05-18 NOTE — ED PROVIDER NOTE - PROGRESS NOTE DETAILS
luis fernando pgy1: Pt vomiting, continues to have abdominal pain, will get TVUS and likely admit luis fernando pgy1: Spoke w/ Dr. Goldberg, states to give steroids (125mg solumedrol) and if pt's pain is not improved to admit, if pt tolerates PO and improves can go home, will give steroids and reassess pt luis fernando pgy1: Consulted OBGYN in context of free fluid on US, unable to reach dr. calos galaviz will page office AK: Spoke to Dr. Longoria. Will accept patient. Made aware of US findings and pending OB/GYN consult.

## 2019-05-18 NOTE — H&P ADULT - NSICDXPASTMEDICALHX_GEN_ALL_CORE_FT
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

## 2019-05-18 NOTE — CONSULT NOTE ADULT - PROBLEM SELECTOR RECOMMENDATION 9
-Repeat bHCG on Monday 5/20 if pt remains inpatient  -If pt discharged before Monday, pt to f/u with Dr. Hurtado outpatient for repeat bHCG and TVUS   -If abdominal pain does not improve with pain medication or acutely worsens consider repeat imaging   -Pain management per primary team, avoid NSAIDS if pt has early pregnancy  -Ectopic precautions discussed: if pain worsens and does not improve with pain medication, pt has heavy vaginal bleeding (soaking 2 pads in 1 hour over 2 hours), pt has symptoms of anemia (lightheadedness, dizziness, SOB, CP)    D/w Dr. Crow Rojas PGY2 -Obtain progesterone level with next blood draw  -Repeat bHCG on Monday 5/20 if pt inpatient  -If pt discharged before Monday, pt to f/u with Dr. Hurtado outpatient for repeat bHCG and TVUS   -If abdominal pain does not improve with pain medication or acutely worsens consider repeat imaging   -Pain management per primary team, avoid NSAIDS if pt has early pregnancy  -Ectopic precautions discussed: if pain worsens and does not improve with pain medication, pt has heavy vaginal bleeding (soaking 2 pads in 1 hour over 2 hours), pt has symptoms of anemia (lightheadedness, dizziness, SOB, CP)    D/w Dr. Crow Rojas PGY2

## 2019-05-18 NOTE — ED ADULT NURSE NOTE - NS PRO PASSIVE SMOKE EXP
Learning About Fever  What is a fever? A fever is a high body temperature. It's one way your body fights being sick. A fever shows that the body is responding to infection or other illnesses, both minor and severe. A fever is a symptom, not an illness by itself. A fever can be a sign that you are ill, but most fevers are not caused by a serious problem. You may have a fever with a minor illness, such as a cold. But sometimes a very serious infection may cause little or no fever. It is important to look at other symptoms, other conditions you have, and how you feel in general. In children, notice how they act and see what symptoms they complain of. What is a normal body temperature? A normal body temperature is about 98. 6ºF. Some people have a normal temperature that is a little higher or a little lower than this. Your temperature may be a little lower in the morning than it is later in the day. It may go up during hot weather or when you exercise, wear heavy clothes, or take a hot bath. Your temperature may also be different depending on how you take it. A temperature taken in the mouth (oral) or under the arm may be a little lower than your core temperature (rectal). What is a fever temperature? A core temperature of 100.4°F or above is considered a fever. What can cause a fever? A fever may be caused by:  · Infections. This is the most common cause of a fever. Examples of infections that can cause a fever include the flu, a kidney infection, or pneumonia. · Some medicines. · Severe trauma or injury, such as a heart attack, stroke, heatstroke, or burns. · Other medical conditions, such as arthritis and some cancers. How can you treat a fever at home? · Ask your doctor if you can take an over-the-counter pain medicine, such as acetaminophen (Tylenol), ibuprofen (Advil, Motrin), or naproxen (Aleve). Be safe with medicines. Read and follow all instructions on the label.   · To prevent dehydration, drink plenty of fluids. Choose water and other caffeine-free clear liquids until you feel better. If you have kidney, heart, or liver disease and have to limit fluids, talk with your doctor before you increase the amount of fluids you drink. Follow-up care is a key part of your treatment and safety. Be sure to make and go to all appointments, and call your doctor if you are having problems. It's also a good idea to know your test results and keep a list of the medicines you take. Where can you learn more? Go to http://melina-andrew.info/. Enter Q954 in the search box to learn more about \"Learning About Fever. \"  Current as of: May 27, 2016  Content Version: 11.1  © 7001-0651 [employer]. Care instructions adapted under license by [employer] (which disclaims liability or warranty for this information). If you have questions about a medical condition or this instruction, always ask your healthcare professional. [place] [address] any warranty or liability for your use of this information. Influenza (Flu): Care Instructions  Your Care Instructions  Influenza (flu) is an infection in the lungs and breathing passages. It is caused by the influenza virus. There are different strains, or types, of the flu virus from year to year. Unlike the common cold, the flu comes on suddenly and the symptoms, such as a cough, congestion, fever, chills, fatigue, aches, and pains, are more severe. These symptoms may last up to 10 days. Although the flu can make you feel very sick, it usually doesn't cause serious health problems. Home treatment is usually all you need for flu symptoms. But your doctor may prescribe antiviral medicine to prevent other health problems, such as pneumonia, from developing.  Older people and those who have a long-term health condition, such as lung disease, are most at risk for having pneumonia or other health problems. Follow-up care is a key part of your treatment and safety. Be sure to make and go to all appointments, and call your doctor if you are having problems. Its also a good idea to know your test results and keep a list of the medicines you take. How can you care for yourself at home? · Get plenty of rest.  · Drink plenty of fluids, enough so that your urine is light yellow or clear like water. If you have kidney, heart, or liver disease and have to limit fluids, talk with your doctor before you increase the amount of fluids you drink. · Take an over-the-counter pain medicine if needed, such as acetaminophen (Tylenol), ibuprofen (Advil, Motrin), or naproxen (Aleve), to relieve fever, headache, and muscle aches. Read and follow all instructions on the label. No one younger than 20 should take aspirin. It has been linked to Reye syndrome, a serious illness. · Do not smoke. Smoking can make the flu worse. If you need help quitting, talk to your doctor about stop-smoking programs and medicines. These can increase your chances of quitting for good. · Breathe moist air from a hot shower or from a sink filled with hot water to help clear a stuffy nose. · Before you use cough and cold medicines, check the label. These medicines may not be safe for young children or for people with certain health problems. · If the skin around your nose and lips becomes sore, put some petroleum jelly on the area. · To ease coughing:  ¨ Drink fluids to soothe a scratchy throat. ¨ Suck on cough drops or plain hard candy. ¨ Take an over-the-counter cough medicine that contains dextromethorphan to help you get some sleep. Read and follow all instructions on the label. ¨ Raise your head at night with an extra pillow. This may help you rest if coughing keeps you awake. · Take any prescribed medicine exactly as directed. Call your doctor if you think you are having a problem with your medicine.   To avoid spreading the flu  · Wash your hands regularly, and keep your hands away from your face. · Stay home from school, work, and other public places until you are feeling better and your fever has been gone for at least 24 hours. The fever needs to have gone away on its own without the help of medicine. · Ask people living with you to talk to their doctors about preventing the flu. They may get antiviral medicine to keep from getting the flu from you. · To prevent the flu in the future, get a flu vaccine every fall. Encourage people living with you to get the vaccine. · Cover your mouth when you cough or sneeze. When should you call for help? Call 911 anytime you think you may need emergency care. For example, call if:  · You have severe trouble breathing. Call your doctor now or seek immediate medical care if:  · You have new or worse trouble breathing. · You seem to be getting much sicker. · You feel very sleepy or confused. · You have a new or higher fever. · You get a new rash. Watch closely for changes in your health, and be sure to contact your doctor if:  · You begin to get better and then get worse. · You are not getting better after 1 week. Where can you learn more? Go to http://melina-andrew.info/. Enter X268 in the search box to learn more about \"Influenza (Flu): Care Instructions. \"  Current as of: May 23, 2016  Content Version: 11.1  © 5221-5824 [employer]. Care instructions adapted under license by [employer] (which disclaims liability or warranty for this information). If you have questions about a medical condition or this instruction, always ask your healthcare professional. [address] any warranty or liability for your use of this information. No

## 2019-05-18 NOTE — ED PROVIDER NOTE - PHYSICAL EXAMINATION
General: well appearing, interactive, well nourished, NAD  HEENT: pupils equal and reactive, normal mucosa, normal oropharynx, no lesions on the lips or on oral mucosa, normal external ears bilaterally   Resp: lungs clear to auscultation bilaterally, symmetric chest wall rise  Abd: soft, TTP epigastrcally, nondistended, normoactive bowel sounds  : no CVA tenderness  Neuro: Moving all extremities  Skin:  normal color for race

## 2019-05-18 NOTE — ED PROVIDER NOTE - OBJECTIVE STATEMENT
38F, pmh of SLE on prednisone pw cc of abd pain    Abd pain started two days ago, increased prednisone to 35mg yesterday (usually takes 5mg daily) and 2mg Dilaudid today which she usually does during flare. Threw up today after taking the meds and did not keep them down. Coming in now because the pain is unbearable.   No cysts or fibroids, LMP 25 days ago due in 3 days.   IUI procedure two weeks     Dr. Goldberg is rheumatologist    Meds: Prednisone,

## 2019-05-18 NOTE — H&P ADULT - PROBLEM SELECTOR PLAN 2
Will need to repeat BHCG on monday and Progestrone in am.  As per OB shots that were given are HCG shots to stimulate ovulation.  So hard to tell if elevation is true pregnancy or leftover residual as hcg has a long half life.

## 2019-05-18 NOTE — H&P ADULT - HISTORY OF PRESENT ILLNESS
38 yr old F w/a PMH of SLE, Raynauds with frequent abdominal flares presents with abdominal pain.  Patient reports having IUI placement 9 days ago with subsequent ovulation shots x 2  5/7 and 5/9.  She reports the pain began on Wed of this week with associated vomiting x 1 and generalized abdominal pain.  She increased her prednisone on her own to 35 mg with some resolution until last night when the patient acutely worsened.

## 2019-05-18 NOTE — ED PROVIDER NOTE - NS ED ROS FT
CONSTITUTIONAL: No fevers, no chills  Eyes: no visual changes  Mouth/Throat: no sore throat  Cardiovascular: No Chest pain  Respiratory: No SOB  Gastrointestinal: refer to HPI  Genitourinary: no dysuria, no hematuria  SKIN: no rashes.  NEURO: no headache, no weakness or numbness

## 2019-05-18 NOTE — ED ADULT NURSE NOTE - OBJECTIVE STATEMENT
38F aaox4 ambulatory accompanied by spouse from home with c/o epigastric pain that started few days ago but getting worst tonight, took PO Dilaudid in the afternoon but vomited after. Denies any chills or fever, no sob, chest pain, dysuria, denies any blood in urine or stool. Had an IUI 2 weeks ago. 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

## 2019-05-18 NOTE — H&P ADULT - NSHPLABSRESULTS_GEN_ALL_CORE
LABS:                         12.9   7.5   )-----------( 192      ( 18 May 2019 01:37 )             38.6     05-18    139  |  103  |  13  ----------------------------<  127<H>  4.1   |  24  |  0.70    Ca    9.1      18 May 2019 01:37    TPro  6.5  /  Alb  3.5  /  TBili  0.2  /  DBili  x   /  AST  19  /  ALT  9<L>  /  AlkPhos  47  05-18                Records reviewed from prior hospitalization.  Labs reviewed remarkable for   EKG personally reviewed   CXR personally reviewed

## 2019-05-18 NOTE — ED PROVIDER NOTE - ATTENDING CONTRIBUTION TO CARE
------------ATTENDING NOTE------------  pt w/  c/o 36 hrs of gradually worsening constant epigastric burning/stabbing pain w/ nausea and small amts nbnb vomiting, pain radiating to entire abdomen, looser nonbloody stools, identical to multiple past vague pain/nausea syndromes attributed to her lupus, describes as always happens with attempts to ween steroids, no fevers, improved w/ medications, d/w her rheum, +HCG and recent insemination, d/w OBGYN, awaiting US and close reassessments and PO challenge -->  - Arnie Malave MD   ----------------------------------------------- ------------ATTENDING NOTE------------  pt w/  c/o 36 hrs of gradually worsening constant epigastric burning/stabbing pain w/ nausea and small amts nbnb vomiting, pain radiating to entire abdomen, looser nonbloody stools, identical to multiple past vague pain/nausea syndromes attributed to her lupus, describes as always happens with attempts to ween steroids, no fevers, improved w/ medications, d/w her rheum, +HCG and recent insemination, d/w OBGYN, awaiting US and close reassessments and PO challenge --> US w/ free fluid (? related to lupus) and no IUP (? too early), plan for admission to medicine for advance diet, OB following, repeat US and HCG (pending 8AM Sign Out [place]).  - Arnie Malave MD   -----------------------------------------------

## 2019-05-18 NOTE — ED ADULT NURSE REASSESSMENT NOTE - NS ED NURSE REASSESS COMMENT FT1
Patient verbalized improvement, vomiting stopped and pain subsided. Pending US result and MD disposition.

## 2019-05-18 NOTE — H&P ADULT - PROBLEM SELECTOR PLAN 1
Unclear if patient having SLE colitis flair.    Spoke to on call attending from Dr. Reid office who reported that okay to give prednisone if needed as it does not cross the placenta.  -Will hold off for now.  -Avoid NSAIDS for pain  -Acetaminophen and opoid if needed

## 2019-05-18 NOTE — ED ADULT NURSE REASSESSMENT NOTE - NS ED NURSE REASSESS COMMENT FT1
Report received from JAYLENE Benitez . Pt AAOx4, NAD, resp nonlabored, skin warm/dry, resting comfortably in bed. OB resident at bedside. Pt denies headache, dizziness, chest pain, palpitations, SOB, abd pain, n/v/d, urinary symptoms, fevers, chills, weakness at this time. Pt awaiting dispo . Safety maintained.

## 2019-05-18 NOTE — H&P ADULT - ALLERGIC/IMMUNOLOGIC
Discharge Instructions- Postpartum    Immunizations given:   Most Recent Immunizations   Administered Date(s) Administered   • DPT 1989   • HIB, Unspecified Formulation 1986   • Hep A, ped/adol, 2 dose 2002   • Hep B, adolescent or pediatric 1997   • Hepatitis A - Adult 2003   • Influenza, injectable, quadrivalent, preservative-free 2018   • Influenza, seasonal, injectable, trivalent 10/09/2012   • MMR 1997   • Meningococcus 2002   • Polio, Oral 1999   • Tb Hilda 2001   • Td:Adult type tetanus/diphtheria 1999   • Tdap 2019       Diet: Eat a well balanced diet including plenty of fiber and fluids such as juice and water.    Activity:   Bathing: Shower or bathe as needed.   Driving: As directed by your doctor or midwife.   Return to work or school: As directed by your doctor or midwife.   Lifting/ Bending: Lift nothing heavier than 10 pounds, bend at the knees, not hips.   Resuming sexual activity: 6 weeks or as recommended by your doctor. Your doctor will discuss birth control options with you at your postpartum check up.   Housework: Limit for the first 2 weeks.   Exercise: Walking is fine. Check with your doctor or midwife for any restrictions.   Other: No douching or tampons.   Other: Wear a supportive bra at all times.    Follow up care  Self care:   -Vaginal discharge may continue for 10 days to 6 weeks, becoming light in color and amount.   -Continue to use your water bottle, tucks, spray, ointment, sitz bath until bleeding stops or discomfort is gone.   -If you had a  section or tubal ligation, keep incision clean and dry.   -Apply cold compresses to breasts to relieve swelling and soothe discomfort.   -Apply warm compresses 5 minutes prior to breastfeeding to relieve engorgement.    Notify your doctor/ midwife if:   -Your temperature is over 100.0 degrees F.   -You notice hard, red, warm or painful areas in your breasts or  legs.   -Your vaginal discharge becomes foul smelling, increases in amount, soaks more than one pad in an hour, is bright red or you pass large clots.   -You have difficulty going to the bathroom.   -You notice swelling, drainage warmth or tenderness at your incision.   -You have symptoms of postpartum depression (loss of appetite, feelings of hopelessness or loss of control, inability to sleep or extensive sleep).    In some cases, you may experience the following; If you do, call your doctor immediately:  · Increased swelling in your face, hands or legs  · Headache  · Blurred vision/ seeing spots  · Throbbing or rushing sounds in your ears  · Pain in your upper abdomen  · Tightness in your chest  · Shortness of breath  · Wheezing  · Coughing    Postpartum Adjustment:  Having a baby can be stressful, and can involve many changes to your mind and body. We care about your health and well-being as you adjust to this new journey. You may find that you are easily upset, impatient, irritable, or tearful. This is normal, and comes and goes quickly. “Baby Blues” may occur anywhere from a few days after delivery to several weeks postpartum and will pass in a short time.  Postpartum Depression goes beyond “baby blues”. Nobody deserves to experience feelings such as overwhelming anxiety, worry, and sadness, and these symptoms can be improved with proper care.    The following may be signs/symptoms of postpartum depression. Call your doctor or midwife immediately if you have the following thoughts/feelings:  •         Thoughts of harming yourself or the baby  •         A lack of interest in the baby, family, or friends  •         Feeling guilty or worthless  •         Feeling hopeless  •         Uncontrollable crying  •         Feelings of being a bad mother  •         Trouble sleeping or oversleeping  •         Changes in appetite  •         Strong feelings of irritability, anger, or nervousness    Below are other resources-  remember, you are not alone:   Call:  ·     Your OB/GYN Provider  ·     Call [facility] at [phone] to arrange for outpatient counseling services (individual or group)  ·     Call [employer] (PS[employer]) at 1-167.941.3250  Text:  ·    Text [employer] at 413-396-9143 (calls/texts are confidential); you can receive information, encouragement, and resources through [employer]SI  Online/Facebook:  ·     [employer] has an online Facebook Support Group (request to join group from website or e-mail [email or web address]  ·     Momsmentalhealthmke.[email or web address] or [email or web address]  Hospital:  ·    Come to the Emergency Room or call 911 if you have thoughts of wanting to hurt yourself, others, or your baby    Weaning instructions if you were prescribed any narcotics/opioid medications (for example, Percocet, Norco/Vicodin, or other narcotic/opioid):   “PRN” or “as needed” medications are prescribed for patients to take only if these medications are needed for pain. They are not meant to be taken on a scheduled basis. If you feel like you don’t need to take your “PRN” or “as needed” narcotics/opioids as often anymore, please contact your doctor for instructions on how to wean off these medications.     Common instructions for weaning off “PRN” or “as needed” narcotic/opioid medications include:   • Increasing time between doses. For example:   o If you normally take your medication every 4 hours, increase the time between doses to 5 or 6 hours for a couple days.  • Decreasing the number of pills you take for each dose. For example:  o If you normally take two pills, then try taking only one pill for each dose.  Options for safe disposal of opioid medications:  · Follow specific disposal instructions on the prescription label or information that comes with the medicine.    Your city/county may have specific guidelines regarding disposal; contact the household  trash/recycling services to learn about disposal guidelines. Another option is contacting your local non-emergency law enforcement agency to see if they sponsor a Drug Take-back program in your community.   The poison control center can also answer questions about safe disposal; the number is 1-183.334.5740.     Who is your help at home after you leave the hospital?    Ideas for help at home: friends, family members, neighbors, and members of your [place] community are all there to help you.    [facility]act[facility] (Breastfeeding help):[phone]    My Morning with Mom:  An opportunity to network with other new mothers and share experiences and discuss topics such as feeding infants, sleep patterns, soothing fussy babies and other parenting concerns.  The group is facilitated by a registered nurse and babies are welcome to attend.  Sessions are held Thursdays from 10:00 - 11:30 am at [facility].  No registration is required. For more information, call [phone].                 negative

## 2019-05-18 NOTE — ED ADULT NURSE NOTE - NSIMPLEMENTINTERV_GEN_ALL_ED
Implemented All Universal Safety Interventions:  [place] to call system. Call bell, personal items and telephone within reach. Instruct patient to call for assistance. Room bathroom lighting operational. Non-slip footwear when patient is off stretcher. Physically safe environment: no spills, clutter or unnecessary equipment. Stretcher in lowest position, wheels locked, appropriate side rails in place.

## 2019-05-19 ENCOUNTER — TRANSCRIPTION ENCOUNTER (OUTPATIENT)
Age: 39
End: 2019-05-19

## 2019-05-19 VITALS
RESPIRATION RATE: 18 BRPM | HEART RATE: 82 BPM | TEMPERATURE: 98 F | SYSTOLIC BLOOD PRESSURE: 95 MMHG | DIASTOLIC BLOOD PRESSURE: 61 MMHG | OXYGEN SATURATION: 98 %

## 2019-05-19 LAB
CULTURE RESULTS: NO GROWTH — SIGNIFICANT CHANGE UP
SPECIMEN SOURCE: SIGNIFICANT CHANGE UP

## 2019-05-19 PROCEDURE — 96374 THER/PROPH/DIAG INJ IV PUSH: CPT

## 2019-05-19 PROCEDURE — 84144 ASSAY OF PROGESTERONE: CPT

## 2019-05-19 PROCEDURE — 85027 COMPLETE CBC AUTOMATED: CPT

## 2019-05-19 PROCEDURE — 83690 ASSAY OF LIPASE: CPT

## 2019-05-19 PROCEDURE — 76856 US EXAM PELVIC COMPLETE: CPT

## 2019-05-19 PROCEDURE — 84702 CHORIONIC GONADOTROPIN TEST: CPT

## 2019-05-19 PROCEDURE — 76830 TRANSVAGINAL US NON-OB: CPT

## 2019-05-19 PROCEDURE — 96376 TX/PRO/DX INJ SAME DRUG ADON: CPT

## 2019-05-19 PROCEDURE — 76815 OB US LIMITED FETUS(S): CPT

## 2019-05-19 PROCEDURE — 81025 URINE PREGNANCY TEST: CPT

## 2019-05-19 PROCEDURE — 87086 URINE CULTURE/COLONY COUNT: CPT

## 2019-05-19 PROCEDURE — 80053 COMPREHEN METABOLIC PANEL: CPT

## 2019-05-19 PROCEDURE — 99285 EMERGENCY DEPT VISIT HI MDM: CPT | Mod: 25

## 2019-05-19 PROCEDURE — 96375 TX/PRO/DX INJ NEW DRUG ADDON: CPT

## 2019-05-19 RX ORDER — ACETAMINOPHEN 500 MG
700 TABLET ORAL ONCE
Refills: 0 | Status: COMPLETED | OUTPATIENT
Start: 2019-05-19 | End: 2019-05-19

## 2019-05-19 RX ORDER — ACETAMINOPHEN 500 MG
1000 TABLET ORAL ONCE
Refills: 0 | Status: DISCONTINUED | OUTPATIENT
Start: 2019-05-19 | End: 2019-05-19

## 2019-05-19 RX ORDER — HYDROMORPHONE HYDROCHLORIDE 2 MG/ML
0.5 INJECTION INTRAMUSCULAR; INTRAVENOUS; SUBCUTANEOUS ONCE
Refills: 0 | Status: DISCONTINUED | OUTPATIENT
Start: 2019-05-19 | End: 2019-05-19

## 2019-05-19 RX ORDER — ONDANSETRON 8 MG/1
4 TABLET, FILM COATED ORAL ONCE
Refills: 0 | Status: COMPLETED | OUTPATIENT
Start: 2019-05-19 | End: 2019-05-19

## 2019-05-19 RX ORDER — ACETAMINOPHEN 500 MG
2 TABLET ORAL
Qty: 0 | Refills: 0 | DISCHARGE
Start: 2019-05-19

## 2019-05-19 RX ADMIN — Medication 280 MILLIGRAM(S): at 02:58

## 2019-05-19 RX ADMIN — Medication 60 MILLIGRAM(S): at 12:54

## 2019-05-19 RX ADMIN — HYDROMORPHONE HYDROCHLORIDE 0.5 MILLIGRAM(S): 2 INJECTION INTRAMUSCULAR; INTRAVENOUS; SUBCUTANEOUS at 08:02

## 2019-05-19 RX ADMIN — Medication 700 MILLIGRAM(S): at 03:13

## 2019-05-19 RX ADMIN — HYDROMORPHONE HYDROCHLORIDE 0.5 MILLIGRAM(S): 2 INJECTION INTRAMUSCULAR; INTRAVENOUS; SUBCUTANEOUS at 08:20

## 2019-05-19 RX ADMIN — Medication 700 MILLIGRAM(S): at 11:00

## 2019-05-19 RX ADMIN — ONDANSETRON 4 MILLIGRAM(S): 8 TABLET, FILM COATED ORAL at 09:43

## 2019-05-19 RX ADMIN — Medication 280 MILLIGRAM(S): at 10:46

## 2019-05-19 NOTE — CHART NOTE - NSCHARTNOTEFT_GEN_A_CORE
Informed by rheumatologist, Dr. Goldberg that pt is resistant in being treating with steroids for her acute SLE flair. Per discussion with [facility], there are no contraindications of steroid use in pregnancy. To clarify, we are unsure if pt has an early pregnancy vs. increased bHCG level from Ovidrel trigger injection.     Plan  -Treat with corticosteroids per rheumatology recommendations for SLE flair  -Pt to f/u outpatient with Dr. Hurtado    Informed Dr. Maria. Discussed with Dr. Barreto ([facility])  MATTHEW Rojas PGY2

## 2019-05-19 NOTE — DISCHARGE NOTE PROVIDER - CARE PROVIDER_API CALL
Goldberg, Avram Z (MD)  Internal Medicine; Rheumatology  [address], [address]  [place] [address]  Phone: [phone]  Fax: [phone]  Follow Up Time: 1 week    Chaim Hurtado)  Obstetrics and Gynecology; Reproductive EndoInfertility  [address] [place] [address]  Phone: [phone]  Fax: [phone]  Follow Up Time: 1 week

## 2019-05-19 NOTE — DISCHARGE NOTE PROVIDER - HOSPITAL COURSE
39 yo female with long h/o SLE; has been complicated in the past with mesenteric vasculitis and AVN.  The disease as it affects her abdomen has been episodic; she is generally quite stable in between flares, and has severe abd pain and inflammation when attacks come on.  She has had various stimuli over the years; -dietary indiscretion -medical illness/procedures.    She had an IUI about 10 days ago, followed by a stimulatory injection via MFM.  She presented to ER 2 days ago with severe abdominal pain, similar to prior flares.  She received IV Medrol 100mg earlier yest and is now much improved, though pain was recurring somewhat this AM.  No fever, chills. There is no evidence of an infectious process.  There is a finding on US which is either early pregnancy or a "chemical pregnancy".        Case discussed with Gyn; no contraindication to steroids; will give another dose of IV steroids-solumedrol 60mg.      Pt can be discharged from Rheum perspective after the IV steroids given; she can f/u with Gyn and Rheum as an outpatient this week.      She should continue prednisone 30mg/day after discharge.      Pt will follow-up with her primary rheumatologist and outpatient Gynecologist Dr. Hurtado within 1 week.

## 2019-05-19 NOTE — PROGRESS NOTE ADULT - ASSESSMENT
39 yo female h/o sle, mesenteric vasculitis, Raynaud's,  a/w abd pain  likely sle flare    rheum eval aprecc  s/p iv steroids with improvement in symptoms  cont po prednisone 30 daily   diet as tolerated    pregnancy  s/p IUI procedure  ob/gyn eval noted  outpt f/u    pt reports feeling better  tolerated diet  dc plan home today on po prednisone  outpt ob/gyn and rheum f/u    d/w rheum

## 2019-05-19 NOTE — DISCHARGE NOTE PROVIDER - PROVIDER TOKENS
PROVIDER:[TOKEN:[[id number]],FOLLOWUP:[1 week]],PROVIDER:[TOKEN:[[id number]],FOLLOWUP:[1 week]]

## 2019-05-19 NOTE — PROGRESS NOTE ADULT - SUBJECTIVE AND OBJECTIVE BOX
JUVENTINO STAPLES  38y Female  MRN:[id number]    Patient is a 38y old  Female who presents with a chief complaint of Abdominal pain (19 May 2019 16:15)    HPI:  38 yr old F w/a PMH of SLE, Raynauds with frequent abdominal flares presents with abdominal pain.  Patient reports having IUI placement 9 days ago with subsequent ovulation shots x 2  5/7 and 5/9.  She reports the pain began on Wed of this week with associated vomiting x 1 and generalized abdominal pain.  She increased her prednisone on her own to 35 mg with some resolution until last night when the patient acutely worsened. (18 May 2019 15:52)      Patient seen and evaluated at bedside. No acute events overnight except as noted.    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  Skin: No rash.  Eyes: No recent vision problems or eye pain.  ENT: No congestion, ear pain, or sore throat.  Endocrine: No thyroid problems.  Cardiovascular: No chest pain or palpation.  Respiratory: No cough, shortness of breath, congestion, or wheezing.  Gastrointestinal: as per hpi  Genitourinary: No dysuria.  Musculoskeletal: No joint swelling.  Neurologic: No headache.    VITALS:  Vital Signs Last 24 Hrs  T(C): 36.6 (19 May 2019 14:37), Max: 37.1 (18 May 2019 21:13)  T(F): 97.9 (19 May 2019 14:37), Max: 98.8 (18 May 2019 21:13)  HR: 82 (19 May 2019 14:37) (77 - 88)  BP: 95/61 (19 May 2019 14:37) (95/61 - 111/80)  BP(mean): --  RR: 18 (19 May 2019 14:37) (18 - 18)  SpO2: 98% (19 May 2019 14:37) (98% - 99%)  CAPILLARY BLOOD GLUCOSE        I&O's Summary    18 May 2019 07:01  -  19 May 2019 07:00  --------------------------------------------------------  IN: 240 mL / OUT: 0 mL / NET: 240 mL    19 May 2019 07:01  -  19 May 2019 19:15  --------------------------------------------------------  IN: 480 mL / OUT: 0 mL / NET: 480 mL        PHYSICAL EXAM:  GENERAL: NAD, well-developed  HEAD:  Atraumatic, Normocephalic  EYES: EOMI, PERRLA, conjunctiva and sclera clear  NECK: Supple, No JVD  CHEST/LUNG: Clear to auscultation bilaterally; No wheeze  HEART: S1, S2; No murmurs, rubs, or gallops  ABDOMEN: Soft, Nontender, Nondistended; Bowel sounds present  EXTREMITIES:  2+ Peripheral Pulses, No clubbing, cyanosis, or edema  PSYCH: Normal affect  NEUROLOGY: AAOX3; non-focal  SKIN: No rashes or lesions    Consultant(s) Notes Reviewed:  [x ] YES  [ ] NO  Care Discussed with Consultants/Other Providers [ x] YES  [ ] NO    MEDS:  MEDICATIONS  (STANDING):    MEDICATIONS  (PRN):  acetaminophen   Tablet .. 650 milliGRAM(s) Oral every 6 hours PRN Moderate Pain (4 - 6)    ALLERGIES:  No Known Allergies      LABS:                        12.9   7.5   )-----------( 192      ( 18 May 2019 01:37 )             38.6     05-18    139  |  103  |  13  ----------------------------<  127<H>  4.1   |  24  |  0.70    Ca    9.1      18 May 2019 01:37    TPro  6.5  /  Alb  3.5  /  TBili  0.2  /  DBili  x   /  AST  19  /  ALT  9<L>  /  AlkPhos  47  05-18          LIVER FUNCTIONS - ( 18 May 2019 01:37 )  Alb: 3.5 g/dL / Pro: 6.5 g/dL / ALK PHOS: 47 U/L / ALT: 9 U/L / AST: 19 U/L / GGT: x             TSH:   A1c:  BNP:  Lipid panel:   cultures: Culture Results:   No growth (05-18 @ 10:48)      RADIOLOGY & ADDITIONAL TESTS:    Imaging Personally Reviewed:  [ ] YES  [ ] NO

## 2019-05-19 NOTE — CONSULT NOTE ADULT - ASSESSMENT
ImP;  39 yo female with SLE manifest by mesenteric vasculitis.  She p/w severe abdominal pain 9 days post IUI procedure.  This is likely an SLE flare of mesenteric inflammation after having had the procedure.  There is no evidence of an infectious process.  There is a finding on US which is either early pregnancy or a "chemical pregnancy".    Rec:  Case d/w Gyn; no contraindication to steroids; will give another dose of IV steroids-solumedrol 60mg.    Pt can be discharged from Rheum perspective after the IV steroids given; she can f/u with Gyn and Rheum as an outpatient this week.    She should continue prednisone 30mg/day after discharge.
38y , LMP 19, PMH significant for SLE and Raynaud's Syndrome, presents with abdominal pain x 2 days. Pt reports having a "trigger shot," to induce ovulation (Ovidrel?) on . IUI procedure was on , uncomplicated. TVUS significant for thick walled cyst with an internal rounded septation 2.2x1.5.x2.1 cm with moderate simple free fluid in the setting of a bHCG 13.3. Labs and VSS. DDx: acute colitis 2/2 SLE flare. +bHCG early pregnancy vs. residual bHCG from Ovidrel injection vs. chemical pregnancy. Low suspicion for ectopic pregnancy given low bHCG, benign abdomen.

## 2019-05-19 NOTE — DISCHARGE NOTE NURSING/CASE MANAGEMENT/SOCIAL WORK - NSDCDPATPORTLINK_GEN_ALL_CORE
You can access the [facility] Patient Portal, offered by [facility], by registering with the following website: [email or web address] [place]

## 2019-06-18 ENCOUNTER — APPOINTMENT (OUTPATIENT)
Dept: HUMAN REPRODUCTION | Facility: CLINIC | Age: 39
End: 2019-06-18
Payer: COMMERCIAL

## 2019-06-18 PROCEDURE — 99214 OFFICE O/P EST MOD 30 MIN: CPT

## 2019-06-27 ENCOUNTER — APPOINTMENT (OUTPATIENT)
Dept: HUMAN REPRODUCTION | Facility: CLINIC | Age: 39
End: 2019-06-27
Payer: COMMERCIAL

## 2019-06-27 PROCEDURE — 76830 TRANSVAGINAL US NON-OB: CPT

## 2019-06-27 PROCEDURE — 99213 OFFICE O/P EST LOW 20 MIN: CPT | Mod: 25

## 2019-07-01 ENCOUNTER — APPOINTMENT (OUTPATIENT)
Dept: HUMAN REPRODUCTION | Facility: CLINIC | Age: 39
End: 2019-07-01
Payer: COMMERCIAL

## 2019-07-01 PROCEDURE — 76830 TRANSVAGINAL US NON-OB: CPT

## 2019-07-01 PROCEDURE — 99213 OFFICE O/P EST LOW 20 MIN: CPT | Mod: 25

## 2019-07-01 PROCEDURE — 36415 COLL VENOUS BLD VENIPUNCTURE: CPT

## 2019-07-03 ENCOUNTER — APPOINTMENT (OUTPATIENT)
Dept: HUMAN REPRODUCTION | Facility: CLINIC | Age: 39
End: 2019-07-03
Payer: COMMERCIAL

## 2019-07-03 PROCEDURE — 99213 OFFICE O/P EST LOW 20 MIN: CPT | Mod: 25

## 2019-07-03 PROCEDURE — 76830 TRANSVAGINAL US NON-OB: CPT

## 2019-07-05 ENCOUNTER — APPOINTMENT (OUTPATIENT)
Dept: HUMAN REPRODUCTION | Facility: CLINIC | Age: 39
End: 2019-07-05
Payer: COMMERCIAL

## 2019-07-05 PROCEDURE — 89261 SPERM ISOLATION COMPLEX: CPT

## 2019-07-05 PROCEDURE — 99214 OFFICE O/P EST MOD 30 MIN: CPT | Mod: 25

## 2019-07-05 PROCEDURE — 58322 ARTIFICIAL INSEMINATION: CPT

## 2019-07-05 PROCEDURE — 76830 TRANSVAGINAL US NON-OB: CPT

## 2019-07-23 ENCOUNTER — APPOINTMENT (OUTPATIENT)
Dept: HUMAN REPRODUCTION | Facility: CLINIC | Age: 39
End: 2019-07-23
Payer: COMMERCIAL

## 2019-07-23 PROCEDURE — 36415 COLL VENOUS BLD VENIPUNCTURE: CPT

## 2019-07-23 PROCEDURE — 76830 TRANSVAGINAL US NON-OB: CPT

## 2019-07-23 PROCEDURE — 99215 OFFICE O/P EST HI 40 MIN: CPT | Mod: 25

## 2019-07-26 ENCOUNTER — OTHER (OUTPATIENT)
Age: 39
End: 2019-07-26

## 2019-07-31 ENCOUNTER — RX RENEWAL (OUTPATIENT)
Age: 39
End: 2019-07-31

## 2019-07-31 ENCOUNTER — APPOINTMENT (OUTPATIENT)
Dept: HUMAN REPRODUCTION | Facility: CLINIC | Age: 39
End: 2019-07-31
Payer: COMMERCIAL

## 2019-07-31 PROCEDURE — 99213 OFFICE O/P EST LOW 20 MIN: CPT | Mod: 25

## 2019-07-31 PROCEDURE — 76830 TRANSVAGINAL US NON-OB: CPT

## 2019-07-31 RX ORDER — LEVOTHYROXINE SODIUM 0.03 MG/1
25 TABLET ORAL DAILY
Qty: 30 | Refills: 3 | Status: ACTIVE | COMMUNITY
Start: 2019-07-26 | End: 1900-01-01

## 2019-08-01 ENCOUNTER — APPOINTMENT (OUTPATIENT)
Dept: ENDOCRINOLOGY | Facility: CLINIC | Age: 39
End: 2019-08-01
Payer: COMMERCIAL

## 2019-08-01 VITALS
HEIGHT: 65 IN | WEIGHT: 103 LBS | OXYGEN SATURATION: 98 % | DIASTOLIC BLOOD PRESSURE: 68 MMHG | HEART RATE: 88 BPM | SYSTOLIC BLOOD PRESSURE: 104 MMHG | BODY MASS INDEX: 17.16 KG/M2

## 2019-08-01 DIAGNOSIS — R79.89 OTHER SPECIFIED ABNORMAL FINDINGS OF BLOOD CHEMISTRY: ICD-10-CM

## 2019-08-01 DIAGNOSIS — E03.9 HYPOTHYROIDISM, UNSPECIFIED: ICD-10-CM

## 2019-08-01 PROCEDURE — 99205 OFFICE O/P NEW HI 60 MIN: CPT

## 2019-08-01 PROCEDURE — 99214 OFFICE O/P EST MOD 30 MIN: CPT

## 2019-08-01 RX ORDER — NORETHINDRONE AND ETHINYL ESTRADIOL 1 MG-35MCG
1-35 KIT ORAL DAILY
Qty: 1 | Refills: 1 | Status: DISCONTINUED | COMMUNITY
Start: 2018-11-12 | End: 2019-08-01

## 2019-08-01 RX ORDER — ERGOCALCIFEROL 1.25 MG/1
1.25 MG CAPSULE, LIQUID FILLED ORAL
Qty: 4 | Refills: 2 | Status: DISCONTINUED | COMMUNITY
Start: 2018-07-10 | End: 2019-08-01

## 2019-08-01 RX ORDER — DOXYCYCLINE HYCLATE 100 MG/1
100 TABLET ORAL
Qty: 33 | Refills: 0 | Status: DISCONTINUED | COMMUNITY
Start: 2018-01-30 | End: 2019-08-01

## 2019-08-01 RX ORDER — DOXYCYCLINE HYCLATE 100 MG/1
100 TABLET ORAL TWICE DAILY
Qty: 5 | Refills: 0 | Status: DISCONTINUED | COMMUNITY
Start: 2018-07-09 | End: 2019-08-01

## 2019-08-01 RX ORDER — NORETHINDRONE AND ETHINYL ESTRADIOL 1 MG-35MCG
1-35 KIT ORAL DAILY
Qty: 1 | Refills: 1 | Status: DISCONTINUED | COMMUNITY
Start: 2018-09-18 | End: 2019-08-01

## 2019-08-05 ENCOUNTER — APPOINTMENT (OUTPATIENT)
Dept: HUMAN REPRODUCTION | Facility: CLINIC | Age: 39
End: 2019-08-05
Payer: COMMERCIAL

## 2019-08-05 PROCEDURE — 76830 TRANSVAGINAL US NON-OB: CPT

## 2019-08-05 PROCEDURE — 99213 OFFICE O/P EST LOW 20 MIN: CPT | Mod: 25

## 2019-08-08 ENCOUNTER — APPOINTMENT (OUTPATIENT)
Dept: HUMAN REPRODUCTION | Facility: CLINIC | Age: 39
End: 2019-08-08
Payer: COMMERCIAL

## 2019-08-08 ENCOUNTER — APPOINTMENT (OUTPATIENT)
Dept: HUMAN REPRODUCTION | Facility: CLINIC | Age: 39
End: 2019-08-08

## 2019-08-08 ENCOUNTER — TRANSCRIPTION ENCOUNTER (OUTPATIENT)
Age: 39
End: 2019-08-08

## 2019-08-08 LAB
25(OH)D3 SERPL-MCNC: 19.9 NG/ML
ANION GAP SERPL CALC-SCNC: 14 MMOL/L
BUN SERPL-MCNC: 8 MG/DL
CALCIUM SERPL-MCNC: 8.4 MG/DL
CHLORIDE SERPL-SCNC: 106 MMOL/L
CO2 SERPL-SCNC: 23 MMOL/L
CREAT SERPL-MCNC: 0.71 MG/DL
ESTIMATED AVERAGE GLUCOSE: 120 MG/DL
GLUCOSE SERPL-MCNC: 78 MG/DL
HBA1C MFR BLD HPLC: 5.8 %
POTASSIUM SERPL-SCNC: 4.4 MMOL/L
SODIUM SERPL-SCNC: 143 MMOL/L
T3 SERPL-MCNC: 106 NG/DL
T4 FREE SERPL-MCNC: 1.2 NG/DL
TSH SERPL-ACNC: 6.74 UIU/ML

## 2019-08-08 PROCEDURE — 99213 OFFICE O/P EST LOW 20 MIN: CPT | Mod: 25

## 2019-08-08 PROCEDURE — 76830 TRANSVAGINAL US NON-OB: CPT

## 2019-08-08 PROCEDURE — 89261 SPERM ISOLATION COMPLEX: CPT

## 2019-08-08 PROCEDURE — 58322 ARTIFICIAL INSEMINATION: CPT

## 2019-10-24 ENCOUNTER — APPOINTMENT (OUTPATIENT)
Dept: ENDOCRINOLOGY | Facility: CLINIC | Age: 39
End: 2019-10-24

## 2019-10-25 ENCOUNTER — TRANSCRIPTION ENCOUNTER (OUTPATIENT)
Age: 39
End: 2019-10-25

## 2019-10-25 LAB — TSH SERPL-ACNC: 1.83 UIU/ML

## 2019-10-25 RX ORDER — LEVOTHYROXINE SODIUM 0.05 MG/1
50 TABLET ORAL
Qty: 60 | Refills: 0 | Status: ACTIVE | COMMUNITY
Start: 2019-08-08 | End: 1900-01-01

## 2019-11-08 NOTE — H&P ADULT - ASSESSMENT
Statement Selected 38 yr old F w/a PMH of SLE/Raynauds with SLE induced colitis flairs presents with abdominal pain

## 2020-10-06 ENCOUNTER — APPOINTMENT (OUTPATIENT)
Dept: HUMAN REPRODUCTION | Facility: CLINIC | Age: 40
End: 2020-10-06
Payer: COMMERCIAL

## 2020-10-06 PROCEDURE — 99214 OFFICE O/P EST MOD 30 MIN: CPT | Mod: 95

## 2020-10-28 ENCOUNTER — APPOINTMENT (OUTPATIENT)
Dept: HUMAN REPRODUCTION | Facility: CLINIC | Age: 40
End: 2020-10-28
Payer: COMMERCIAL

## 2020-10-28 PROCEDURE — 99072 ADDL SUPL MATRL&STAF TM PHE: CPT

## 2020-10-28 PROCEDURE — 58340 CATHETER FOR HYSTEROGRAPHY: CPT

## 2020-10-28 PROCEDURE — 99213 OFFICE O/P EST LOW 20 MIN: CPT | Mod: 25

## 2020-10-28 PROCEDURE — 76831 ECHO EXAM UTERUS: CPT

## 2020-11-11 ENCOUNTER — APPOINTMENT (OUTPATIENT)
Dept: HUMAN REPRODUCTION | Facility: CLINIC | Age: 40
End: 2020-11-11
Payer: COMMERCIAL

## 2020-11-11 PROCEDURE — 76830 TRANSVAGINAL US NON-OB: CPT

## 2020-11-11 PROCEDURE — 36415 COLL VENOUS BLD VENIPUNCTURE: CPT

## 2020-11-11 PROCEDURE — 99072 ADDL SUPL MATRL&STAF TM PHE: CPT

## 2020-11-11 PROCEDURE — 99213 OFFICE O/P EST LOW 20 MIN: CPT | Mod: 25

## 2020-11-15 ENCOUNTER — APPOINTMENT (OUTPATIENT)
Dept: HUMAN REPRODUCTION | Facility: CLINIC | Age: 40
End: 2020-11-15
Payer: COMMERCIAL

## 2020-11-15 PROCEDURE — 99072 ADDL SUPL MATRL&STAF TM PHE: CPT

## 2020-11-15 PROCEDURE — 76830 TRANSVAGINAL US NON-OB: CPT

## 2020-11-15 PROCEDURE — 36415 COLL VENOUS BLD VENIPUNCTURE: CPT

## 2020-11-15 PROCEDURE — 99213 OFFICE O/P EST LOW 20 MIN: CPT | Mod: 25

## 2020-11-18 ENCOUNTER — APPOINTMENT (OUTPATIENT)
Dept: HUMAN REPRODUCTION | Facility: CLINIC | Age: 40
End: 2020-11-18
Payer: COMMERCIAL

## 2020-11-18 PROCEDURE — 76830 TRANSVAGINAL US NON-OB: CPT

## 2020-11-18 PROCEDURE — 99072 ADDL SUPL MATRL&STAF TM PHE: CPT

## 2020-11-18 PROCEDURE — 36415 COLL VENOUS BLD VENIPUNCTURE: CPT

## 2020-11-18 PROCEDURE — 99213 OFFICE O/P EST LOW 20 MIN: CPT | Mod: 25

## 2020-11-20 ENCOUNTER — APPOINTMENT (OUTPATIENT)
Dept: HUMAN REPRODUCTION | Facility: CLINIC | Age: 40
End: 2020-11-20
Payer: COMMERCIAL

## 2020-11-20 PROCEDURE — 36415 COLL VENOUS BLD VENIPUNCTURE: CPT

## 2020-11-20 PROCEDURE — 99213 OFFICE O/P EST LOW 20 MIN: CPT | Mod: 25

## 2020-11-20 PROCEDURE — 76830 TRANSVAGINAL US NON-OB: CPT

## 2020-11-22 ENCOUNTER — APPOINTMENT (OUTPATIENT)
Dept: HUMAN REPRODUCTION | Facility: CLINIC | Age: 40
End: 2020-11-22
Payer: COMMERCIAL

## 2020-11-22 PROCEDURE — 99213 OFFICE O/P EST LOW 20 MIN: CPT | Mod: 25

## 2020-11-22 PROCEDURE — 76830 TRANSVAGINAL US NON-OB: CPT

## 2020-11-22 PROCEDURE — 36415 COLL VENOUS BLD VENIPUNCTURE: CPT

## 2020-11-24 ENCOUNTER — APPOINTMENT (OUTPATIENT)
Dept: HUMAN REPRODUCTION | Facility: CLINIC | Age: 40
End: 2020-11-24
Payer: COMMERCIAL

## 2020-11-24 PROCEDURE — 99213 OFFICE O/P EST LOW 20 MIN: CPT | Mod: 25

## 2020-11-24 PROCEDURE — 76830 TRANSVAGINAL US NON-OB: CPT

## 2020-11-24 PROCEDURE — 36415 COLL VENOUS BLD VENIPUNCTURE: CPT

## 2020-11-25 ENCOUNTER — APPOINTMENT (OUTPATIENT)
Dept: HUMAN REPRODUCTION | Facility: CLINIC | Age: 40
End: 2020-11-25
Payer: COMMERCIAL

## 2020-11-25 PROCEDURE — 36415 COLL VENOUS BLD VENIPUNCTURE: CPT

## 2020-11-25 PROCEDURE — 76830 TRANSVAGINAL US NON-OB: CPT

## 2020-11-25 PROCEDURE — 99213 OFFICE O/P EST LOW 20 MIN: CPT | Mod: 25

## 2020-11-27 ENCOUNTER — APPOINTMENT (OUTPATIENT)
Dept: HUMAN REPRODUCTION | Facility: CLINIC | Age: 40
End: 2020-11-27
Payer: COMMERCIAL

## 2020-11-27 PROCEDURE — 99213 OFFICE O/P EST LOW 20 MIN: CPT | Mod: 25

## 2020-11-27 PROCEDURE — 36415 COLL VENOUS BLD VENIPUNCTURE: CPT

## 2020-11-27 PROCEDURE — 76830 TRANSVAGINAL US NON-OB: CPT

## 2020-11-28 ENCOUNTER — APPOINTMENT (OUTPATIENT)
Dept: HUMAN REPRODUCTION | Facility: CLINIC | Age: 40
End: 2020-11-28
Payer: COMMERCIAL

## 2020-11-28 PROCEDURE — 99213 OFFICE O/P EST LOW 20 MIN: CPT | Mod: 25

## 2020-11-28 PROCEDURE — 36415 COLL VENOUS BLD VENIPUNCTURE: CPT

## 2020-11-28 PROCEDURE — 76830 TRANSVAGINAL US NON-OB: CPT

## 2020-11-29 ENCOUNTER — APPOINTMENT (OUTPATIENT)
Dept: HUMAN REPRODUCTION | Facility: CLINIC | Age: 40
End: 2020-11-29
Payer: COMMERCIAL

## 2020-11-29 PROCEDURE — 89261 SPERM ISOLATION COMPLEX: CPT

## 2020-11-29 PROCEDURE — 89280 ASSIST OOCYTE FERTILIZATION: CPT

## 2020-11-29 PROCEDURE — 76948 ECHO GUIDE OVA ASPIRATION: CPT

## 2020-11-29 PROCEDURE — 89250 CULTR OOCYTE/EMBRYO <4 DAYS: CPT

## 2020-11-29 PROCEDURE — 58970 RETRIEVAL OF OOCYTE: CPT

## 2020-11-29 PROCEDURE — 89254 OOCYTE IDENTIFICATION: CPT

## 2021-02-23 NOTE — DISCHARGE NOTE ADULT - NS MD DC FALL RISK RISK
I put in the new order  Please touch base with patient's mother and let her know that she needs to schedule at [facility]  Thank you! For information on Fall & Injury Prevention, visit [email or web address]

## 2021-08-09 ENCOUNTER — APPOINTMENT (OUTPATIENT)
Dept: ENDOCRINOLOGY | Facility: CLINIC | Age: 41
End: 2021-08-09

## 2022-06-02 ENCOUNTER — APPOINTMENT (OUTPATIENT)
Dept: PLASTIC SURGERY | Facility: CLINIC | Age: 42
End: 2022-06-02
Payer: COMMERCIAL

## 2022-06-02 VITALS
DIASTOLIC BLOOD PRESSURE: 70 MMHG | WEIGHT: 105 LBS | BODY MASS INDEX: 17.49 KG/M2 | HEIGHT: 65 IN | OXYGEN SATURATION: 98 % | SYSTOLIC BLOOD PRESSURE: 101 MMHG | HEART RATE: 101 BPM | TEMPERATURE: 97.7 F

## 2022-06-02 DIAGNOSIS — L53.9 ERYTHEMATOUS CONDITION, UNSPECIFIED: ICD-10-CM

## 2022-06-02 PROCEDURE — 99202 OFFICE O/P NEW SF 15 MIN: CPT

## 2022-06-04 PROBLEM — L53.9: Status: ACTIVE | Noted: 2018-10-19

## 2022-09-06 ENCOUNTER — EMERGENCY (EMERGENCY)
Facility: HOSPITAL | Age: 42
LOS: 1 days | Discharge: HOME | End: 2022-09-06
Attending: EMERGENCY MEDICINE
Payer: COMMERCIAL

## 2022-09-06 VITALS
WEIGHT: 104.94 LBS | HEIGHT: 65 IN | RESPIRATION RATE: 18 BRPM | DIASTOLIC BLOOD PRESSURE: 81 MMHG | SYSTOLIC BLOOD PRESSURE: 115 MMHG | HEART RATE: 83 BPM | TEMPERATURE: 99 F | OXYGEN SATURATION: 97 %

## 2022-09-06 LAB
ALBUMIN SERPL ELPH-MCNC: 3.1 G/DL — LOW (ref 3.3–5)
ALP SERPL-CCNC: 44 U/L — SIGNIFICANT CHANGE UP (ref 40–120)
ALT FLD-CCNC: 11 U/L — SIGNIFICANT CHANGE UP (ref 10–45)
ANION GAP SERPL CALC-SCNC: 11 MMOL/L — SIGNIFICANT CHANGE UP (ref 5–17)
AST SERPL-CCNC: 31 U/L — SIGNIFICANT CHANGE UP (ref 10–40)
BASE EXCESS BLDV CALC-SCNC: -1.3 MMOL/L — SIGNIFICANT CHANGE UP (ref -2–3)
BASOPHILS # BLD AUTO: 0.01 K/UL — SIGNIFICANT CHANGE UP (ref 0–0.2)
BASOPHILS NFR BLD AUTO: 0.2 % — SIGNIFICANT CHANGE UP (ref 0–2)
BILIRUB SERPL-MCNC: 0.3 MG/DL — SIGNIFICANT CHANGE UP (ref 0.2–1.2)
BUN SERPL-MCNC: 12 MG/DL — SIGNIFICANT CHANGE UP (ref 7–23)
CA-I SERPL-SCNC: 1.06 MMOL/L — LOW (ref 1.15–1.33)
CALCIUM SERPL-MCNC: 8 MG/DL — LOW (ref 8.4–10.5)
CHLORIDE BLDV-SCNC: 105 MMOL/L — SIGNIFICANT CHANGE UP (ref 96–108)
CHLORIDE SERPL-SCNC: 105 MMOL/L — SIGNIFICANT CHANGE UP (ref 96–108)
CO2 BLDV-SCNC: 25 MMOL/L — SIGNIFICANT CHANGE UP (ref 22–26)
CO2 SERPL-SCNC: 20 MMOL/L — LOW (ref 22–31)
CREAT SERPL-MCNC: 0.68 MG/DL — SIGNIFICANT CHANGE UP (ref 0.5–1.3)
EGFR: 113 ML/MIN/1.73M2 — SIGNIFICANT CHANGE UP
EOSINOPHIL # BLD AUTO: 0 K/UL — SIGNIFICANT CHANGE UP (ref 0–0.5)
EOSINOPHIL NFR BLD AUTO: 0 % — SIGNIFICANT CHANGE UP (ref 0–6)
GAS PNL BLDV: 135 MMOL/L — LOW (ref 136–145)
GAS PNL BLDV: SIGNIFICANT CHANGE UP
GAS PNL BLDV: SIGNIFICANT CHANGE UP
GLUCOSE BLDV-MCNC: 99 MG/DL — SIGNIFICANT CHANGE UP (ref 70–99)
GLUCOSE SERPL-MCNC: 105 MG/DL — HIGH (ref 70–99)
HCG SERPL-ACNC: <2 MIU/ML — SIGNIFICANT CHANGE UP
HCO3 BLDV-SCNC: 24 MMOL/L — SIGNIFICANT CHANGE UP (ref 22–29)
HCT VFR BLD CALC: 39.5 % — SIGNIFICANT CHANGE UP (ref 34.5–45)
HCT VFR BLDA CALC: 40 % — SIGNIFICANT CHANGE UP (ref 34.5–46.5)
HGB BLD CALC-MCNC: 13.3 G/DL — SIGNIFICANT CHANGE UP (ref 11.7–16.1)
HGB BLD-MCNC: 12.8 G/DL — SIGNIFICANT CHANGE UP (ref 11.5–15.5)
IMM GRANULOCYTES NFR BLD AUTO: 0.5 % — SIGNIFICANT CHANGE UP (ref 0–1.5)
LACTATE BLDV-MCNC: 0.9 MMOL/L — SIGNIFICANT CHANGE UP (ref 0.5–2)
LIDOCAIN IGE QN: 48 U/L — SIGNIFICANT CHANGE UP (ref 7–60)
LYMPHOCYTES # BLD AUTO: 0.97 K/UL — LOW (ref 1–3.3)
LYMPHOCYTES # BLD AUTO: 16.4 % — SIGNIFICANT CHANGE UP (ref 13–44)
MAGNESIUM SERPL-MCNC: 2 MG/DL — SIGNIFICANT CHANGE UP (ref 1.6–2.6)
MCHC RBC-ENTMCNC: 29 PG — SIGNIFICANT CHANGE UP (ref 27–34)
MCHC RBC-ENTMCNC: 32.4 GM/DL — SIGNIFICANT CHANGE UP (ref 32–36)
MCV RBC AUTO: 89.4 FL — SIGNIFICANT CHANGE UP (ref 80–100)
MONOCYTES # BLD AUTO: 0.13 K/UL — SIGNIFICANT CHANGE UP (ref 0–0.9)
MONOCYTES NFR BLD AUTO: 2.2 % — SIGNIFICANT CHANGE UP (ref 2–14)
NEUTROPHILS # BLD AUTO: 4.77 K/UL — SIGNIFICANT CHANGE UP (ref 1.8–7.4)
NEUTROPHILS NFR BLD AUTO: 80.7 % — HIGH (ref 43–77)
NRBC # BLD: 0 /100 WBCS — SIGNIFICANT CHANGE UP (ref 0–0)
PCO2 BLDV: 40 MMHG — SIGNIFICANT CHANGE UP (ref 39–42)
PH BLDV: 7.38 — SIGNIFICANT CHANGE UP (ref 7.32–7.43)
PLATELET # BLD AUTO: 198 K/UL — SIGNIFICANT CHANGE UP (ref 150–400)
PO2 BLDV: 20 MMHG — LOW (ref 25–45)
POTASSIUM BLDV-SCNC: 4.1 MMOL/L — SIGNIFICANT CHANGE UP (ref 3.5–5.1)
POTASSIUM SERPL-MCNC: 5 MMOL/L — SIGNIFICANT CHANGE UP (ref 3.5–5.3)
POTASSIUM SERPL-SCNC: 5 MMOL/L — SIGNIFICANT CHANGE UP (ref 3.5–5.3)
PROT SERPL-MCNC: 6.5 G/DL — SIGNIFICANT CHANGE UP (ref 6–8.3)
RBC # BLD: 4.42 M/UL — SIGNIFICANT CHANGE UP (ref 3.8–5.2)
RBC # FLD: 12.7 % — SIGNIFICANT CHANGE UP (ref 10.3–14.5)
SAO2 % BLDV: 26.5 % — LOW (ref 67–88)
SODIUM SERPL-SCNC: 136 MMOL/L — SIGNIFICANT CHANGE UP (ref 135–145)
WBC # BLD: 5.91 K/UL — SIGNIFICANT CHANGE UP (ref 3.8–10.5)
WBC # FLD AUTO: 5.91 K/UL — SIGNIFICANT CHANGE UP (ref 3.8–10.5)

## 2022-09-06 PROCEDURE — 83690 ASSAY OF LIPASE: CPT

## 2022-09-06 PROCEDURE — 84702 CHORIONIC GONADOTROPIN TEST: CPT

## 2022-09-06 PROCEDURE — 85025 COMPLETE CBC W/AUTO DIFF WBC: CPT

## 2022-09-06 PROCEDURE — U0005: CPT

## 2022-09-06 PROCEDURE — 96375 TX/PRO/DX INJ NEW DRUG ADDON: CPT | Mod: XU

## 2022-09-06 PROCEDURE — 74174 CTA ABD&PLVS W/CONTRAST: CPT | Mod: MA

## 2022-09-06 PROCEDURE — 84132 ASSAY OF SERUM POTASSIUM: CPT

## 2022-09-06 PROCEDURE — 96374 THER/PROPH/DIAG INJ IV PUSH: CPT | Mod: XU

## 2022-09-06 PROCEDURE — 80053 COMPREHEN METABOLIC PANEL: CPT

## 2022-09-06 PROCEDURE — 83735 ASSAY OF MAGNESIUM: CPT

## 2022-09-06 PROCEDURE — 85018 HEMOGLOBIN: CPT

## 2022-09-06 PROCEDURE — 99285 EMERGENCY DEPT VISIT HI MDM: CPT

## 2022-09-06 PROCEDURE — U0003: CPT

## 2022-09-06 PROCEDURE — 99284 EMERGENCY DEPT VISIT MOD MDM: CPT | Mod: 25

## 2022-09-06 PROCEDURE — 82330 ASSAY OF CALCIUM: CPT

## 2022-09-06 PROCEDURE — 82947 ASSAY GLUCOSE BLOOD QUANT: CPT

## 2022-09-06 PROCEDURE — 74174 CTA ABD&PLVS W/CONTRAST: CPT | Mod: 26,MA

## 2022-09-06 PROCEDURE — 85014 HEMATOCRIT: CPT

## 2022-09-06 PROCEDURE — 82803 BLOOD GASES ANY COMBINATION: CPT

## 2022-09-06 PROCEDURE — 83605 ASSAY OF LACTIC ACID: CPT

## 2022-09-06 PROCEDURE — 84295 ASSAY OF SERUM SODIUM: CPT

## 2022-09-06 PROCEDURE — 36415 COLL VENOUS BLD VENIPUNCTURE: CPT

## 2022-09-06 PROCEDURE — 82435 ASSAY OF BLOOD CHLORIDE: CPT

## 2022-09-06 RX ORDER — SODIUM CHLORIDE 9 MG/ML
1000 INJECTION INTRAMUSCULAR; INTRAVENOUS; SUBCUTANEOUS ONCE
Refills: 0 | Status: COMPLETED | OUTPATIENT
Start: 2022-09-06 | End: 2022-09-06

## 2022-09-06 RX ORDER — HYDROMORPHONE HYDROCHLORIDE 2 MG/ML
0.5 INJECTION INTRAMUSCULAR; INTRAVENOUS; SUBCUTANEOUS ONCE
Refills: 0 | Status: DISCONTINUED | OUTPATIENT
Start: 2022-09-06 | End: 2022-09-06

## 2022-09-06 RX ORDER — ONDANSETRON 8 MG/1
4 TABLET, FILM COATED ORAL ONCE
Refills: 0 | Status: COMPLETED | OUTPATIENT
Start: 2022-09-06 | End: 2022-09-06

## 2022-09-06 RX ORDER — FAMOTIDINE 10 MG/ML
20 INJECTION INTRAVENOUS ONCE
Refills: 0 | Status: COMPLETED | OUTPATIENT
Start: 2022-09-06 | End: 2022-09-06

## 2022-09-06 RX ORDER — ACETAMINOPHEN 500 MG
725 TABLET ORAL ONCE
Refills: 0 | Status: COMPLETED | OUTPATIENT
Start: 2022-09-06 | End: 2022-09-06

## 2022-09-06 RX ADMIN — Medication 125 MILLIGRAM(S): at 23:21

## 2022-09-06 RX ADMIN — SODIUM CHLORIDE 1000 MILLILITER(S): 9 INJECTION INTRAMUSCULAR; INTRAVENOUS; SUBCUTANEOUS at 23:27

## 2022-09-06 RX ADMIN — ONDANSETRON 4 MILLIGRAM(S): 8 TABLET, FILM COATED ORAL at 23:22

## 2022-09-06 RX ADMIN — HYDROMORPHONE HYDROCHLORIDE 0.5 MILLIGRAM(S): 2 INJECTION INTRAMUSCULAR; INTRAVENOUS; SUBCUTANEOUS at 23:21

## 2022-09-06 RX ADMIN — FAMOTIDINE 20 MILLIGRAM(S): 10 INJECTION INTRAVENOUS at 23:22

## 2022-09-06 NOTE — ED PROVIDER NOTE - OBJECTIVE STATEMENT
43 yo F with a PMH of SLE cb mesenteric vasculitis and AVN on prednisone 10mg daily p/w acute onset of diffuse abd pain x a few hours ago. No provoking factors. Pain 10/10, has been constant since onset. Associated with nausea and vomiting. Has not been able to keep down her steroids or pain meds. Pain described as a burning and camping sensation, worse in her upper abdomen. States lupus relatively well controlled, does not have flares often. Pain today similar to 2 summers ago and in 2019 when she required IV steroids. Was in [place] last flare, states she had IV steroids x 1 dose with resolution of her abd pain. Was last admitted at [facility] in 2019 for abd pain post IUI and was again tx with IV steroids (IV Medrol 100mg). Pt follows with Dr. Goldberg, Rheum. Denies fever, chills, chest pain, SOB, cough, palpitations, abd distension, diarrhea, blood in stool, urinary complaints, headache, weakness, dizziness, syncope. No hx of abd surgeries. Normal BMS.

## 2022-09-06 NOTE — ED PROVIDER NOTE - NS ED ATTENDING STATEMENT MOD
This was a shared visit with the AUGUSTUS. I reviewed and verified the documentation and independently performed the documented:

## 2022-09-06 NOTE — ED PROVIDER NOTE - PROGRESS NOTE DETAILS
Santos WOMACK (PGY-3)  received s/o from day team pending ct read and reevaluation. ct showing enteritis, similar to prior lupus flares. spoke to pt and offered admission for rheum eval and monitoring her smyptoms. pt states currently her pain is 0/10; she would like to go home. she has an appt w/ her rheum later today and can also get further doses of solumedrol at the infusion center after work today. she also has prednisone at home she can take after d/c. will d/c to home with return precautions

## 2022-09-06 NOTE — ED PROVIDER NOTE - NSFOLLOWUPINSTRUCTIONS_ED_ALL_ED_FT
You were seen today in the emergency room for abdominal pain, which was due to enteritis    Continue to take your steroids as prescribed by your Rheumatologist. See your Rheumatologist later today as scheduled     If you develop any new or worsening symptoms you need to return immediately to the emergency department. If you experience any of the following please come right back to the emergency room: severe nausea and vomiting with inability to tolerate eating, severe worsening of your pain, a new fever, new bleeding in stool or vomit, confusion, new numbness or weakness, passing out

## 2022-09-06 NOTE — ED PROVIDER NOTE - CLINICAL SUMMARY MEDICAL DECISION MAKING FREE TEXT BOX
Oleg Gurrola): 42 F with a PMHx of lupus on chronic steroids with prior episodes of abdominal pain as acute flares presenting with similar symptoms. Physical exam as above. Differential diagnosis include lupus flare vs acute mesentery ischemia. Discussed with PT and planned for CT angiogram, IV steroids, labs, and symptoms control. Will most likely admit.

## 2022-09-06 NOTE — ED PROVIDER NOTE - PHYSICAL EXAMINATION
CONSTITUTIONAL: Well appearing and in no apparent distress.  ENT: Airway patent, moist mucous membranes.   EYES: Pupils equal, round and reactive to light. EOMI. Conjunctiva normal appearing.   CARDIAC: Normal rate, regular rhythm.  Heart sounds S1, S2.    RESPIRATORY: Breath sounds clear and equal bilaterally.   GASTROINTESTINAL: Abdomen soft, not distended. Diffuse TTP, worse LUQ>epigastric area. No rebound or guarding. No CVAT bilaterally.   MUSCULOSKELETAL: Spine appears normal.  NEUROLOGICAL: Alert and oriented x3, no focal deficits, no motor or sensory deficits. 5/5 muscle strength throughout.  SKIN: Skin normal color, warm, dry and intact.   PSYCHIATRIC: Normal mood and affect.

## 2022-09-06 NOTE — ED ADULT NURSE NOTE - OBJECTIVE STATEMENT
42 y old female with PMH of lupus presents to the ED from home c/o abdominal pain x a few hours. Pt states she had sudden onset of constant, burning, cramping LUQ and mid epigastric pain. Pt also endorses nausea and vomiting. Pt states "this feels like my lupus flare ups." Denies HA, fevers, chills, CP, SOB, diarrhea, urinary s/s. Pt A&O x 4, ambulatory. Respirations nonlabored on RA. Abdomen soft, nondistended but tender on palpation of LUQ and mid epigastric pain. Peripheral pulses strong. Skin warm, dry and intact. Bed locked in lowest position, side rails up and call bell in reach.

## 2022-09-06 NOTE — ED PROVIDER NOTE - PATIENT PORTAL LINK FT
You can access the FollowMyHealth Patient Portal offered by [facility] by registering at the following website: [email or web address]. By joining [employer]’s FollowMyHealth portal, you will also be able to view your health information using other applications (apps) compatible with our system.

## 2022-09-06 NOTE — ED ADULT TRIAGE NOTE - CHIEF COMPLAINT QUOTE
"Im having a lupus flair up", c/o abdominal pain and vomiting  Pt attempted to take at home steroid but vomited it up

## 2022-09-06 NOTE — ED PROVIDER NOTE - MUSCULOSKELETAL, MLM
No peripheral edema. Spine appears normal, range of motion is not limited, no muscle or joint tenderness

## 2022-09-07 VITALS
SYSTOLIC BLOOD PRESSURE: 101 MMHG | TEMPERATURE: 97 F | OXYGEN SATURATION: 99 % | HEART RATE: 82 BPM | RESPIRATION RATE: 20 BRPM | DIASTOLIC BLOOD PRESSURE: 67 MMHG

## 2022-09-07 LAB — SARS-COV-2 RNA SPEC QL NAA+PROBE: SIGNIFICANT CHANGE UP

## 2022-10-24 NOTE — ED PROVIDER NOTE - NEURO NEGATIVE STATEMENT, MLM
ED
no loss of consciousness, no gait abnormality, no headache, no sensory deficits, and no weakness.

## 2022-11-25 NOTE — CONSULT NOTE ADULT - SUBJECTIVE AND OBJECTIVE BOX
HISTORY OF PRESENT ILLNESS:  39 yo female with long h/o SLE; has been complicated in the past with mesenteric vasculitis and AVN.  The disease as it affects her abdomen has been episodic; she is generally quite stable in between flares, and has severe abd pain and inflammation when attacks come on.  She has had various stimuli over the years; -dietary indiscretion -medical illness/procedures.  She had an IUI about 10 days ago, followed by a stimulatory injection via MFM.  She presented to ER 2 days ago with severe abdominal pain, similar to prior flares.  She received IV Medrol 100mg earlier yest and is now much improved, though pain was recurring somewhat this AM.  No fever, chills.      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 650 milliGRAM(s) Oral every 6 hours PRN Moderate Pain (4 - 6)      Allergies    No Known Allergies    Intolerances        PERTINENT MEDICATION HISTORY:    SOCIAL HISTORY:    FAMILY HISTORY:  No pertinent family history in first degree relatives      Vital Signs Last 24 Hrs  T(C): 36.7 (19 May 2019 07:45), Max: 37.1 (18 May 2019 21:13)  T(F): 98.1 (19 May 2019 07:45), Max: 98.8 (18 May 2019 21:13)  HR: 88 (19 May 2019 07:45) (65 - 88)  BP: 111/80 (19 May 2019 07:45) (96/66 - 111/80)  BP(mean): --  RR: 18 (19 May 2019 07:45) (16 - 18)  SpO2: 99% (19 May 2019 04:21) (98% - 99%)    Daily Height in cm: 165.1 (18 May 2019 16:08)    Daily     PHYSICAL EXAM:      Constitutional:  well appearing    Eyes:  EOMI      Neck:  supple    Gastrointestinal:  abd soft, mild tenderness in epigastric area, no masses, no rebound/guarding    Extremities:  No edema    Vascular:    Neurological:    Skin:    Lymph Nodes:    Musculoskeletal:  no synovitis    Psychiatric:  Awake, alert, in good spirits        LABS:                        12.9   7.5   )-----------( 192      ( 18 May 2019 01:37 )             38.6     05-18    139  |  103  |  13  ----------------------------<  127<H>  4.1   |  24  |  0.70    Ca    9.1      18 May 2019 01:37    TPro  6.5  /  Alb  3.5  /  TBili  0.2  /  DBili  x   /  AST  19  /  ALT  9<L>  /  AlkPhos  47  05-18          RADIOLOGY & ADDITIONAL STUDIES:
R2 GYNECOLOGIC CONSULT NOTE    38y , LMP 19, 9 days s/p IUI, PMH significant for SLE and Raynaud's Syndrome, presents with abdominal pain x 2 days. Pt states that she began to have abdominal pain on Wed 5/15 that acutely worsened last night that she attributes to an acute colitis 2/2 SLE flare. Pt states she has experienced this pain before. At it's worst, the pain is 10/10, and she had 3 episodes of NBNB emesis. She states that she has flares once a year or whenever she is under stress. She usually  takes prednisone 5mg daily, which was recently increased it to 35 mg qD on 5/15 by her rheumatologist, Dr. Goldberg. Denies vaginal bleeding, vaginal discharge, fever, chills, urinary or GI complaints.    Pt reports having a "trigger shot," to induce ovulation (Ovidrel?) on . IUI procedure was on , uncomplicated.    In the ED, pt afebrile, HR 65-80, -119/82-93, WBC 7.5, H/H 12.9/38.6. bHCG 13.3. Per her rheumatologist, pt received solumedrol 100 mg x 1 for acute SLE flare. Pt received dilaudid 0.5 mg x 2 with improvement in abdominal pain. Pain currently 4-5/10 on the pain scale.  GYN consulted for pregnancy of unknown location.    OB/GYN HISTORY:   OBGYN= MICHAELLE: [facility] OB: Joseph    Past Medical History:   Vasculitis  Miscarriage  Raynaud disease  Acute colitis  Acute lupus nephritis 2014 (resolved)  Anemia  SLE (systemic lupus erythematosus)        Surgical History:    No Past Surgical History      Medications:  prednisone 35 mg qD    Allergies  No Known Allergies      Social History:   denies    Psych History:  denies      FAMILY HISTORY:  No pertinent family history in first degree relatives      REVIEW OF SYSTEMS: negative, except stated in HPI    OBJECTIVE FINDINGS:    Vital Signs Last 24 Hrs  T(C): 36.8 (18 May 2019 07:23), Max: 36.8 (18 May 2019 07:23)  T(F): 98.2 (18 May 2019 07:23), Max: 98.2 (18 May 2019 07:23)  HR: 76 (18 May 2019 07:23) (65 - 80)  BP: 111/83 (18 May 2019 07:23) (111/83 - 119/84)  BP(mean): --  RR: 16 (18 May 2019 07:23) (16 - 18)  SpO2: 100% (18 May 2019 07:23) (99% - 100%)    PHYSICAL EXAM:    GENERAL: NAD, well-developed  ABDOMEN: Soft, Nontender, Nondistended; reports LLQ "pressure" on deep palpation.  No rebound, No guarding  EXTREMITIES:  2+ Peripheral Pulses, No clubbing, cyanosis, or edema, Bridgette's sign negative  PELVIC:   -Inspection: no vulvar or labial lesions, erythema.   -Speculum exam: no vaginal bleeding, physiologic white, vaginal discharge  -Bimanual exam: uterus anteverted, small, mobile. Adnexa nonpalpable, nontender b/l    LABS:                        12.9   7.5   )-----------( 192      ( 18 May 2019 01:37 )             38.6         139  |  103  |  13  ----------------------------<  127<H>  4.1   |  24  |  0.70    Ca    9.1      18 May 2019 01:37    TPro  6.5  /  Alb  3.5  /  TBili  0.2  /  DBili  x   /  AST  19  /  ALT  9<L>  /  AlkPhos  47            RADIOLOGY & ADDITIONAL STUDIES:  < from: US OB/GYN Early Sonogram (19 @ 06:11) >  EXAM:  US TRANSVAGINAL                          EXAM:  US OB EARLY FETUS(ES)                            PROCEDURE DATE:  2019            INTERPRETATION:  CLINICAL INFORMATION: Patient with history of lupus   presents with abdominal pain. IUI procedure 2 weeks ago. Beta hCG = 13.3.    LMP: 2019    COMPARISON: None available.    TECHNIQUE: Endovaginal pelvic sonogram as per order. Transabdominal   pelvic sonogram was also performed as part of our standard protocol.   Color and SpectralDoppler was performed.    FINDINGS:    Uterus: 6.5 x 2.9 x 4.6 cm. No intrauterine gestational sac.    Endometrium: 4 mm. Within normal limits.    Right ovary: 2.3 x 1.6 x 2.5 cm. Within normal limits. Normal arterial   and venous waveforms.    Left ovary: 2.8 x 2.3 x 2.4 cm with thick walled cyst and internal   rounded septation measuring up to 2.2 x 1.5 x 2.1 cm.    Fluid: Moderate volume simple pelvic free fluid.    IMPRESSION:    Pregnancy of unknown origin; no intrauterine gestational sac, however,   IUI was performed only 2 weeks ago and beta hCG is only 13.3. Recommend   short interval follow-up sonogram.    Thick-walled left ovarian cyst with internal rounded septation measuring   up to 2.2 cm. Moderate volume simple pelvic free fluid.    < end of copied text >
Patient complaining of right ankle pain and swelling status post twisted while playing soccer.  Patient fell to ground but denies any other injury.  No head injury LOC headache neck pain back pain arm pain chest pain short of breath abdominal pain weakness numbness or any other injury.  Orthopedist none

## 2022-11-26 ENCOUNTER — EMERGENCY (EMERGENCY)
Facility: HOSPITAL | Age: 42
LOS: 1 days | Discharge: HOME | End: 2022-11-26
Attending: STUDENT IN AN ORGANIZED HEALTH CARE EDUCATION/TRAINING PROGRAM
Payer: COMMERCIAL

## 2022-11-26 VITALS
SYSTOLIC BLOOD PRESSURE: 111 MMHG | HEIGHT: 65 IN | RESPIRATION RATE: 20 BRPM | OXYGEN SATURATION: 99 % | TEMPERATURE: 98 F | DIASTOLIC BLOOD PRESSURE: 79 MMHG | HEART RATE: 91 BPM | WEIGHT: 110.01 LBS

## 2022-11-26 VITALS
DIASTOLIC BLOOD PRESSURE: 67 MMHG | SYSTOLIC BLOOD PRESSURE: 103 MMHG | HEART RATE: 93 BPM | RESPIRATION RATE: 19 BRPM | TEMPERATURE: 98 F | OXYGEN SATURATION: 98 %

## 2022-11-26 LAB
ALBUMIN SERPL ELPH-MCNC: 3.2 G/DL — LOW (ref 3.3–5)
ALP SERPL-CCNC: 48 U/L — SIGNIFICANT CHANGE UP (ref 40–120)
ALT FLD-CCNC: 12 U/L — SIGNIFICANT CHANGE UP (ref 10–45)
ANION GAP SERPL CALC-SCNC: 10 MMOL/L — SIGNIFICANT CHANGE UP (ref 5–17)
APPEARANCE UR: CLEAR — SIGNIFICANT CHANGE UP
APTT BLD: 31.6 SEC — SIGNIFICANT CHANGE UP (ref 27.5–35.5)
AST SERPL-CCNC: 21 U/L — SIGNIFICANT CHANGE UP (ref 10–40)
BASE EXCESS BLDV CALC-SCNC: -0.1 MMOL/L — SIGNIFICANT CHANGE UP (ref -2–3)
BASOPHILS # BLD AUTO: 0.04 K/UL — SIGNIFICANT CHANGE UP (ref 0–0.2)
BASOPHILS NFR BLD AUTO: 0.3 % — SIGNIFICANT CHANGE UP (ref 0–2)
BILIRUB SERPL-MCNC: 0.1 MG/DL — LOW (ref 0.2–1.2)
BILIRUB UR-MCNC: NEGATIVE — SIGNIFICANT CHANGE UP
BLD GP AB SCN SERPL QL: NEGATIVE — SIGNIFICANT CHANGE UP
BUN SERPL-MCNC: 9 MG/DL — SIGNIFICANT CHANGE UP (ref 7–23)
CA-I SERPL-SCNC: 1.16 MMOL/L — SIGNIFICANT CHANGE UP (ref 1.15–1.33)
CALCIUM SERPL-MCNC: 8.3 MG/DL — LOW (ref 8.4–10.5)
CHLORIDE BLDV-SCNC: 107 MMOL/L — SIGNIFICANT CHANGE UP (ref 96–108)
CHLORIDE SERPL-SCNC: 109 MMOL/L — HIGH (ref 96–108)
CO2 BLDV-SCNC: 28 MMOL/L — HIGH (ref 22–26)
CO2 SERPL-SCNC: 24 MMOL/L — SIGNIFICANT CHANGE UP (ref 22–31)
COLOR SPEC: SIGNIFICANT CHANGE UP
CREAT SERPL-MCNC: 0.76 MG/DL — SIGNIFICANT CHANGE UP (ref 0.5–1.3)
DIFF PNL FLD: NEGATIVE — SIGNIFICANT CHANGE UP
EGFR: 102 ML/MIN/1.73M2 — SIGNIFICANT CHANGE UP
EOSINOPHIL # BLD AUTO: 0.09 K/UL — SIGNIFICANT CHANGE UP (ref 0–0.5)
EOSINOPHIL NFR BLD AUTO: 0.6 % — SIGNIFICANT CHANGE UP (ref 0–6)
GAS PNL BLDV: 138 MMOL/L — SIGNIFICANT CHANGE UP (ref 136–145)
GAS PNL BLDV: SIGNIFICANT CHANGE UP
GLUCOSE BLDV-MCNC: 84 MG/DL — SIGNIFICANT CHANGE UP (ref 70–99)
GLUCOSE SERPL-MCNC: 90 MG/DL — SIGNIFICANT CHANGE UP (ref 70–99)
GLUCOSE UR QL: NEGATIVE — SIGNIFICANT CHANGE UP
HCG SERPL-ACNC: <2 MIU/ML — SIGNIFICANT CHANGE UP
HCO3 BLDV-SCNC: 27 MMOL/L — SIGNIFICANT CHANGE UP (ref 22–29)
HCT VFR BLD CALC: 41.6 % — SIGNIFICANT CHANGE UP (ref 34.5–45)
HCT VFR BLDA CALC: 41 % — SIGNIFICANT CHANGE UP (ref 34.5–46.5)
HGB BLD CALC-MCNC: 13.5 G/DL — SIGNIFICANT CHANGE UP (ref 11.7–16.1)
HGB BLD-MCNC: 13.5 G/DL — SIGNIFICANT CHANGE UP (ref 11.5–15.5)
IMM GRANULOCYTES NFR BLD AUTO: 0.5 % — SIGNIFICANT CHANGE UP (ref 0–0.9)
INR BLD: 1.07 RATIO — SIGNIFICANT CHANGE UP (ref 0.88–1.16)
KETONES UR-MCNC: NEGATIVE — SIGNIFICANT CHANGE UP
LACTATE BLDV-MCNC: 2.2 MMOL/L — HIGH (ref 0.5–2)
LEUKOCYTE ESTERASE UR-ACNC: NEGATIVE — SIGNIFICANT CHANGE UP
LIDOCAIN IGE QN: 61 U/L — HIGH (ref 7–60)
LYMPHOCYTES # BLD AUTO: 13.1 % — SIGNIFICANT CHANGE UP (ref 13–44)
LYMPHOCYTES # BLD AUTO: 2.03 K/UL — SIGNIFICANT CHANGE UP (ref 1–3.3)
MCHC RBC-ENTMCNC: 29.7 PG — SIGNIFICANT CHANGE UP (ref 27–34)
MCHC RBC-ENTMCNC: 32.5 GM/DL — SIGNIFICANT CHANGE UP (ref 32–36)
MCV RBC AUTO: 91.6 FL — SIGNIFICANT CHANGE UP (ref 80–100)
MONOCYTES # BLD AUTO: 0.7 K/UL — SIGNIFICANT CHANGE UP (ref 0–0.9)
MONOCYTES NFR BLD AUTO: 4.5 % — SIGNIFICANT CHANGE UP (ref 2–14)
NEUTROPHILS # BLD AUTO: 12.61 K/UL — HIGH (ref 1.8–7.4)
NEUTROPHILS NFR BLD AUTO: 81 % — HIGH (ref 43–77)
NITRITE UR-MCNC: NEGATIVE — SIGNIFICANT CHANGE UP
NRBC # BLD: 0 /100 WBCS — SIGNIFICANT CHANGE UP (ref 0–0)
PCO2 BLDV: 52 MMHG — HIGH (ref 39–42)
PH BLDV: 7.32 — SIGNIFICANT CHANGE UP (ref 7.32–7.43)
PH UR: 7 — SIGNIFICANT CHANGE UP (ref 5–8)
PLATELET # BLD AUTO: 228 K/UL — SIGNIFICANT CHANGE UP (ref 150–400)
PO2 BLDV: 22 MMHG — LOW (ref 25–45)
POTASSIUM BLDV-SCNC: 4.1 MMOL/L — SIGNIFICANT CHANGE UP (ref 3.5–5.1)
POTASSIUM SERPL-MCNC: 4.2 MMOL/L — SIGNIFICANT CHANGE UP (ref 3.5–5.3)
POTASSIUM SERPL-SCNC: 4.2 MMOL/L — SIGNIFICANT CHANGE UP (ref 3.5–5.3)
PROT SERPL-MCNC: 6.4 G/DL — SIGNIFICANT CHANGE UP (ref 6–8.3)
PROT UR-MCNC: 100 — SIGNIFICANT CHANGE UP
PROTHROM AB SERPL-ACNC: 12.3 SEC — SIGNIFICANT CHANGE UP (ref 10.5–13.4)
RBC # BLD: 4.54 M/UL — SIGNIFICANT CHANGE UP (ref 3.8–5.2)
RBC # FLD: 12.5 % — SIGNIFICANT CHANGE UP (ref 10.3–14.5)
RH IG SCN BLD-IMP: POSITIVE — SIGNIFICANT CHANGE UP
SAO2 % BLDV: 29.1 % — LOW (ref 67–88)
SARS-COV-2 RNA SPEC QL NAA+PROBE: SIGNIFICANT CHANGE UP
SODIUM SERPL-SCNC: 143 MMOL/L — SIGNIFICANT CHANGE UP (ref 135–145)
SP GR SPEC: 1.05 — HIGH (ref 1.01–1.02)
UROBILINOGEN FLD QL: NEGATIVE — SIGNIFICANT CHANGE UP
WBC # BLD: 15.55 K/UL — HIGH (ref 3.8–10.5)
WBC # FLD AUTO: 15.55 K/UL — HIGH (ref 3.8–10.5)

## 2022-11-26 PROCEDURE — 85025 COMPLETE CBC W/AUTO DIFF WBC: CPT

## 2022-11-26 PROCEDURE — 82803 BLOOD GASES ANY COMBINATION: CPT

## 2022-11-26 PROCEDURE — U0003: CPT

## 2022-11-26 PROCEDURE — 86900 BLOOD TYPING SEROLOGIC ABO: CPT

## 2022-11-26 PROCEDURE — 99285 EMERGENCY DEPT VISIT HI MDM: CPT

## 2022-11-26 PROCEDURE — 96375 TX/PRO/DX INJ NEW DRUG ADDON: CPT | Mod: XU

## 2022-11-26 PROCEDURE — 74174 CTA ABD&PLVS W/CONTRAST: CPT | Mod: MA

## 2022-11-26 PROCEDURE — 82330 ASSAY OF CALCIUM: CPT

## 2022-11-26 PROCEDURE — 93005 ELECTROCARDIOGRAM TRACING: CPT

## 2022-11-26 PROCEDURE — 96374 THER/PROPH/DIAG INJ IV PUSH: CPT | Mod: XU

## 2022-11-26 PROCEDURE — 86901 BLOOD TYPING SEROLOGIC RH(D): CPT

## 2022-11-26 PROCEDURE — 80053 COMPREHEN METABOLIC PANEL: CPT

## 2022-11-26 PROCEDURE — 86922 COMPATIBILITY TEST ANTIGLOB: CPT

## 2022-11-26 PROCEDURE — 85014 HEMATOCRIT: CPT

## 2022-11-26 PROCEDURE — 84295 ASSAY OF SERUM SODIUM: CPT

## 2022-11-26 PROCEDURE — 87086 URINE CULTURE/COLONY COUNT: CPT

## 2022-11-26 PROCEDURE — 85018 HEMOGLOBIN: CPT

## 2022-11-26 PROCEDURE — 84132 ASSAY OF SERUM POTASSIUM: CPT

## 2022-11-26 PROCEDURE — 85730 THROMBOPLASTIN TIME PARTIAL: CPT

## 2022-11-26 PROCEDURE — 83605 ASSAY OF LACTIC ACID: CPT

## 2022-11-26 PROCEDURE — 84702 CHORIONIC GONADOTROPIN TEST: CPT

## 2022-11-26 PROCEDURE — U0005: CPT

## 2022-11-26 PROCEDURE — 82947 ASSAY GLUCOSE BLOOD QUANT: CPT

## 2022-11-26 PROCEDURE — 74174 CTA ABD&PLVS W/CONTRAST: CPT | Mod: 26,MA

## 2022-11-26 PROCEDURE — 99285 EMERGENCY DEPT VISIT HI MDM: CPT | Mod: 25

## 2022-11-26 PROCEDURE — 85610 PROTHROMBIN TIME: CPT

## 2022-11-26 PROCEDURE — 86850 RBC ANTIBODY SCREEN: CPT

## 2022-11-26 PROCEDURE — 82435 ASSAY OF BLOOD CHLORIDE: CPT

## 2022-11-26 PROCEDURE — 83690 ASSAY OF LIPASE: CPT

## 2022-11-26 RX ORDER — SODIUM CHLORIDE 9 MG/ML
1000 INJECTION INTRAMUSCULAR; INTRAVENOUS; SUBCUTANEOUS ONCE
Refills: 0 | Status: COMPLETED | OUTPATIENT
Start: 2022-11-26 | End: 2022-11-26

## 2022-11-26 RX ORDER — HYDROMORPHONE HYDROCHLORIDE 2 MG/ML
1 INJECTION INTRAMUSCULAR; INTRAVENOUS; SUBCUTANEOUS ONCE
Refills: 0 | Status: DISCONTINUED | OUTPATIENT
Start: 2022-11-26 | End: 2022-11-26

## 2022-11-26 RX ADMIN — SODIUM CHLORIDE 1000 MILLILITER(S): 9 INJECTION INTRAMUSCULAR; INTRAVENOUS; SUBCUTANEOUS at 13:59

## 2022-11-26 RX ADMIN — Medication 100 MILLIGRAM(S): at 11:41

## 2022-11-26 RX ADMIN — HYDROMORPHONE HYDROCHLORIDE 1 MILLIGRAM(S): 2 INJECTION INTRAMUSCULAR; INTRAVENOUS; SUBCUTANEOUS at 11:41

## 2022-11-26 RX ADMIN — HYDROMORPHONE HYDROCHLORIDE 1 MILLIGRAM(S): 2 INJECTION INTRAMUSCULAR; INTRAVENOUS; SUBCUTANEOUS at 12:00

## 2022-11-26 NOTE — ED PROVIDER NOTE - PROGRESS NOTE DETAILS
Abel Pineda MD, PGY-1: Pt feeling better after medication. CT neg for arterial or venous occlusion to mesentery. She is amenable to going home with her rheumatology follow up.

## 2022-11-26 NOTE — ED ADULT NURSE NOTE - OBJECTIVE STATEMENT
Received pt c/o abdominal pain nausea and vomiting. Pt states this is typical of her Lupus flare up. Breathing easy and non labored. Pt ambulatory.

## 2022-11-26 NOTE — ED PROVIDER NOTE - NSFOLLOWUPINSTRUCTIONS_ED_ALL_ED_FT
You were seen in the emergency department for abdominal pain. We have evaluated you and determined that you do not require further hospital interventions.    Please follow up with your RHEUMATOLOGIST to discuss the results of your stay in our department. You should take your medications as instructed by your doctor.    If you start to experience worsening symptoms such as severe abdominal pain, persistent vomiting, inability to eat, please return to the emergency department for further evaluation.

## 2022-11-26 NOTE — ED PROVIDER NOTE - CLINICAL SUMMARY MEDICAL DECISION MAKING FREE TEXT BOX
42F with lupus and previous ED visits for severe abdominal pain here with similar pain. No current concern for bowel obstruction. Will obtain CTA to evaluate for mesenteric ischemia vs aortic pathology. Will give IV dilaudid and solu-medrol and re-assess pain. Admission vs dc dependent on patient's symptoms post-meds and findings from CTA.

## 2022-11-26 NOTE — ED PROVIDER NOTE - PHYSICAL EXAMINATION
General: well appearing, alert, oriented to person, time, place  Psych: mood appropriate  Head: normocephalic; atraumatic  Eyes: conjunctivae clear bilaterally, sclerae anicteric  ENT: no nasal flaring, patent nares  Cardio: RRR, no m/r/g, pulses 2+ b/l  Resp: CATB, no w/r/r  GI: diffuse tenderness; soft, non-distended  : no CVA tenderness  Neuro: normal sensation, moving all four extremities equally  Skin: No evidence of rash or bruising  MSK: normal movement of all extremities  Lymph/Vasc: no LE edema

## 2022-11-26 NOTE — ED PROVIDER NOTE - OBJECTIVE STATEMENT
42-year-old female past medical history of lupus complicated by mesenteric vasculitis here for 1 day of severe abdominal pain consistent with her last hospital presentation for lupus flareup.  Last time she was here she found pain relief with IV Dilaudid and methylprednisolone. She endorses regular bowel movements and continues to pass flatus; no nausea vomiting diarrhea fevers or chills. Denies chest pain or shortness of breath.

## 2022-11-26 NOTE — ED ADULT NURSE REASSESSMENT NOTE - NS ED NURSE REASSESS COMMENT FT1
Pt received lying in bed in no acute distress. A&OX4. Pt here for c/o abdominal pain., HX of lupus. Pt medicated as ordered, pt tolerated well. Safety maintained. CLAUDIA Arriola RN 11:00 Pt received lying in bed in no acute distress. A&OX4. Pt here for c/o abdominal pain., HX of lupus. Pt medicated as ordered, pt tolerated well. Safety maintained. CLAUDIA Arriola RN

## 2022-11-26 NOTE — ED PROVIDER NOTE - PATIENT PORTAL LINK FT
You can access the FollowMyHealth Patient Portal offered by [facility] by registering at the following website: [email or web address]. By joining [employer]’s FollowMyHealth portal, you will also be able to view your health information using other applications (apps) compatible with our system.

## 2022-11-26 NOTE — ED PROVIDER NOTE - ATTENDING CONTRIBUTION TO CARE
I, Rehan Holm, performed a history and physical exam of the patient and discussed their management with the resident and/or advanced care provider. I reviewed the resident and/or advanced care provider's note and agree with the documented findings and plan of care. I was present and available for all procedures.    42-year-old female past medical history of lupus complicated by mesenteric vasculitis here for 1 day of severe abdominal pain consistent with her last hospital presentation for lupus flareup.  Last time she was here she found pain relief with IV Dilaudid and methylprednisolone. She endorses regular bowel movements and continues to pass flatus; no nausea vomiting diarrhea fevers or chills. Denies chest pain or shortness of breath.    Well appearing and in NAD, head normal appearing atraumatic, trachea midline, no respiratory distress, lungs cta bilaterally, rrr no murmurs, soft No distention, right upper quadrant and epigastric tenderness palpation no rebound tenderness no left upper quadrant left lower quadrant right lower quadrant tenderness palpation no pelvic tenderness palpation no rebound tenderness no CVA tenderness palpation, no visible extremity deformities, Alert and oriented, non focal neuro exam, skin warm and dry, normal affect and mood, No leg swelling no JVD    Concerning for abdominal pain with possible lupus flare otherwise systems consistent with previous history of mesenteric inflammatory condition possible mesenteric vasculitis will evaluate with CTA rule out obstruction versus vascular etiology otherwise pain medication as needed IV steroids urine analysis pregnancy test screening blood work reassess for disposition hopefully home with further follow-up with disposition pending work-up and reevaluation's

## 2022-11-27 LAB
CULTURE RESULTS: SIGNIFICANT CHANGE UP
SPECIMEN SOURCE: SIGNIFICANT CHANGE UP

## 2022-12-01 ENCOUNTER — APPOINTMENT (OUTPATIENT)
Dept: OBGYN | Facility: CLINIC | Age: 42
End: 2022-12-01

## 2022-12-01 VITALS
HEIGHT: 65 IN | DIASTOLIC BLOOD PRESSURE: 82 MMHG | WEIGHT: 101 LBS | BODY MASS INDEX: 16.83 KG/M2 | SYSTOLIC BLOOD PRESSURE: 117 MMHG | HEART RATE: 93 BPM

## 2022-12-01 PROCEDURE — 99386 PREV VISIT NEW AGE 40-64: CPT

## 2022-12-01 NOTE — PLAN
[FreeTextEntry1] : JUVENTINO STAPLES is a 42 year old presenting for annual GYN exam \par -pap/HPV done today \par -BSE\par -rx for mammo/sono\par \par RTO 1 year for annual

## 2022-12-01 NOTE — END OF VISIT
[FreeTextEntry3] : I, Jennifer Orozco, acted solely as a scribe for Dr. Siobhan Davis MD., on 12/01/2022.\par \par All medical record entries made by the scribe were at my, Dr. Siobhan Davis MD., direction and personally dictated by me on 12/01/2022. I have personally reviewed the chart and agree that the record accurately reflects my personal performance of the history, physical exam, assessment and plan.\par

## 2022-12-01 NOTE — HISTORY OF PRESENT ILLNESS
[FreeTextEntry1] : JUVENTINO STAPLES is a 42 year old presenting for annual GYN exam. Pt is doing well and has no complaints. Reports normal periods. \par \par ob hx:  x1  \par GYN: MAB @ 7 weeks \par PMH: lupus, Raynards\par NKDA

## 2022-12-02 LAB — HPV HIGH+LOW RISK DNA PNL CVX: NOT DETECTED

## 2022-12-12 LAB — CYTOLOGY CVX/VAG DOC THIN PREP: NORMAL

## 2023-01-09 ENCOUNTER — NON-APPOINTMENT (OUTPATIENT)
Age: 43
End: 2023-01-09

## 2023-03-19 ENCOUNTER — NON-APPOINTMENT (OUTPATIENT)
Age: 43
End: 2023-03-19

## 2023-04-04 ENCOUNTER — APPOINTMENT (OUTPATIENT)
Dept: HUMAN REPRODUCTION | Facility: CLINIC | Age: 43
End: 2023-04-04
Payer: COMMERCIAL

## 2023-04-04 PROCEDURE — 99215 OFFICE O/P EST HI 40 MIN: CPT | Mod: 95

## 2023-06-14 NOTE — PATIENT PROFILE ADULT - FUNCTIONAL SCREEN CURRENT LEVEL: BATHING, MLM
0 = independent Olanzapine Pregnancy And Lactation Text: This medication is pregnancy category C.   There are no adequate and well controlled trials with olanzapine in pregnant females.  Olanzapine should be used during pregnancy only if the potential benefit justifies the potential risk to the fetus.   In a study in lactating healthy women, olanzapine was excreted in breast milk.  It is recommended that women taking olanzapine should not breast feed.

## 2023-07-06 NOTE — ED PROVIDER NOTE - CARE PLAN
[facility]  OPERATIVE REPORT    Name:  Nicole Coyle  MR#:  [[id number]]  :  [id number]  ACCOUNT #:  [de-identified]  DATE OF SERVICE:  2023    CLINICAL SERVICE:  Vascular Surgery    PREOPERATIVE DIAGNOSIS:  Debilitating right leg claudication. POSTOPERATIVE DIAGNOSIS:  Debilitating right leg claudication. PROCEDURE PERFORMED:  1. Right common femoral endarterectomy with CorMatrix angioplasty. 2.  Aortogram with right external iliac angioplasty and stent with an 8 x 30 Smart stent. SURGEON:  Namrata Shah MD    ASSISTANT:  .    ANESTHESIA:  General.    COMPLICATIONS:  None. SPECIMENS REMOVED:  None. IMPLANTS:  .    ESTIMATED BLOOD LOSS:  .    INDICATIONS:  This is a 70-year-old female with history of peripheral vascular disease, status post right external iliac stent at outside hospital.  She presented with worsening debilitating claudication in the right leg. She was found to have common femoral artery occlusion and the distal external iliac stenosis. We elected to proceed. PROCEDURE:  After getting informed consent, the patient brought to the operating room. Anesthesia was then induced. Preop antibiotics were given before skin incisions. The patient's right leg was then prepped and draped in a normal sterile fashion. Incision was made with 15-blade in the inguinal ligament. We dissected down through subcutaneous tissue, where the common femoral was identified. The proximal SFA was dissected and controlled with vessel loops. The profunda was also dissected and controlled with vessel loops. The distal external inguinal ligament was dissected. With self-retaining retractors, they helped to expose. We were able to identify and dissect and got both medial and lateral epigastric vessels with vessel loops. The distal external was also controlled with vessel loops. I was able to palpate the stent placement. We gave 10,000 heparin. We kept over 250.   We did
Principal Discharge DX:	Dyspepsia  Secondary Diagnosis:	Undifferentiated abdominal pain  Secondary Diagnosis:	Pregnancy

## 2023-12-28 NOTE — ED ADULT NURSE NOTE - NSSUHOSCREENINGYN_ED_ALL_ED
Pt reports living in home with chair lift, standard toilet, walk in shower, shower chair, GB's, and 24/7 asides assist with IADLs Yes - the patient is able to be screened

## 2024-01-31 ENCOUNTER — APPOINTMENT (OUTPATIENT)
Dept: OBGYN | Facility: CLINIC | Age: 44
End: 2024-01-31

## 2024-02-28 ENCOUNTER — APPOINTMENT (OUTPATIENT)
Dept: OBGYN | Facility: CLINIC | Age: 44
End: 2024-02-28
Payer: COMMERCIAL

## 2024-02-28 VITALS
HEIGHT: 65 IN | DIASTOLIC BLOOD PRESSURE: 79 MMHG | BODY MASS INDEX: 17.33 KG/M2 | SYSTOLIC BLOOD PRESSURE: 114 MMHG | WEIGHT: 104 LBS

## 2024-02-28 DIAGNOSIS — Z01.419 ENCOUNTER FOR GYNECOLOGICAL EXAMINATION (GENERAL) (ROUTINE) W/OUT ABNORMAL FINDINGS: ICD-10-CM

## 2024-02-28 PROCEDURE — 99396 PREV VISIT EST AGE 40-64: CPT

## 2024-02-28 NOTE — HISTORY OF PRESENT ILLNESS
[Patient reported PAP Smear was normal] : Patient reported PAP Smear was normal [FreeTextEntry1] : JUVENTINO STAPLES 43 year old female  LMP , presents for annual gyn exam and offers no complaints. She reports monthly menses, not too heavy, not painful. She denies intermenstrual bleeding.She denies abdominal and pelvic pain. No vaginal discharge or vaginitis symptoms. No urinary complaints. BM is normal per patient.   ob hx:  x1   GYN: MAB @ 7 weeks  PMH: lupus, Raynards  NKDA   [PapSmeardate] : 12/22

## 2024-02-28 NOTE — PLAN
[FreeTextEntry1] : 43 year old female pt presents for annual gyn exam Health Maintenance: BSE taught Reviewed diet and exercise Breast and pelvic exam performed Pap/HPV conducted Rx given for mammogram and breast sonogram Advised pt to see PCP annually   RTO in 1 year or PRN

## 2024-02-28 NOTE — END OF VISIT
[FreeTextEntry2] : This note was written by Eryn Baez on 02/28/2024 actively solely ANDREW Gan M.D.  All medical record entries made by the Scribe were at my, ANDREW Gan M.D. direction and personally dictated by me on 02/28/2024. I have personally reviewed the chart and agree that the record reflects my personal performance of the history, physical exam, assessment, and plan.

## 2024-02-29 LAB — HPV HIGH+LOW RISK DNA PNL CVX: NOT DETECTED

## 2024-03-04 LAB — CYTOLOGY CVX/VAG DOC THIN PREP: NORMAL

## 2024-05-01 NOTE — DISCHARGE NOTE PROVIDER - NSDCCPCAREPLAN_GEN_ALL_CORE_FT
[facility]  Occupational Therapy Treatment Note     Date: 5/1/2024  Name: Jessenia Da Silva  [facility] Number: [id number]    Therapy Diagnosis:   Encounter Diagnosis   Name Primary?    Right wrist pain Yes     Physician: Marcia Ojeda NP    Physician Orders: Eval and Treat  Medical Diagnosis: S62.024A (ICD-10-CM) - Closed nondisplaced fracture of middle third of scaphoid bone of right wrist, initial encounter M79.641 (ICD-10-CM) - Right hand pain  Surgical Procedure and Date: DOI: 2/4/24  Evaluation Date: 3/28/2024  Insurance Authorization Period Expiration: (3/19/2024-12/31/2024)   Plan of Care Certification Period: 12 weeks; 6/20/24  Date of Return to MD:   Visit # / Visits authorized: 1 / 1        Precautions:  Standard     Time In: 12:20  Time Out: 12:50  Total Billable Time: 30 minutes      Subjective     Patient reports: Pt was cleared to discontinue brace; no pain reported with daily tasks.   She was compliant with home exercise program given last session.   Response to previous treatment: improving ROM      Pain: 2/10  Location: right hands      Objective     Objective Measures updated at progress report unless specified.  Observation/Appearance:  Skin intact; minimal swelling        Elbow and Wrist ROM. Measured in degrees.    3/28/2024 3/28/2024     Left Right   Supination/Pronation WNL WNL   Wrist Ext/Flex 75/73 55/50!   Wrist RD/UD WNL WNL          Strength (Dyanmometer) and Pinch Strength (Pinch Gauge)  Measured in pounds and psi. Average of three trials.    3/28/2024 3/28/2024     Right  Left    Rung II NT NT   Key Pinch NT NT   3pt Pinch NT NT   2pt Pinch NT NT       Treatment     Jessenia received the treatments listed below:      therapeutic exercises to develop endurance and ROM for 10 minutes including:  - Wrist ROM  - in hand manipulation w/ pom poms   - flex bar smile/frown/ isometrics (yellow)  - isospheres 3'  - octy 3'      manual therapy techniques: Soft  tissue Mobilization were applied to the: R wrist for 10 minutes, including:  IASTM    supervised modalities after being cleared for contradictions: Fluido      Patient Education and Home Exercises     Education provided:   - HEP  - Progress towards goals     Written Home Exercises Provided: Patient instructed to cont prior HEP.  Exercises were reviewed and Jessenia was able to demonstrate them prior to the end of the session.  Jessenia demonstrated good  understanding of the home exercise program provided. See electronic medical record under Patient Instructions for exercises provided during therapy sessions.       Assessment     Pt with improving tolerance to light resistance. She performs all activity with min pain. Plan to progress strengthening as tolerated.     Jessenia is progressing well towards her goals and there are no updates to goals at this time. Pt prognosis is Good.     Patient will continue to benefit from skilled outpatient occupational therapy to address the deficits listed in the problem list on initial evaluation provide patient/family education and to maximize patient's level of independence in the home and community environment.     Patient's spiritual, cultural and educational needs considered and patient agreeable to plan of care and goals.    Anticipated barriers to occupational therapy: none      Goals:   Short Term Goals: (in 4 weeks)  1) Patient will be independent in HEP progressing, continue  2) Decrease pain in R wrsit to no more than 3/10 worst in ADL/IADL's progressing, continue  3) Increase AROM in wrist flex/ext for improved functioning in ADL/IADL'sprogressing, continue  4) Assess  strength as appropriateprogressing, continue        Long Term Goals: (by discharge)  1) Decrease pain in R wrist to no more than 1/10 worst in ADL/IADL'sprogressing, continue  2) Increase AROM of R wrist flex/ext to WFL for increased functioning in ADL/IADL'sprogressing, continue  3) Return to  cooking without pain.progressing, continue    Plan     Updates/Grading for next session: Continue OT POC    Evelin Manrique OT   5/1/2024          PRINCIPAL DISCHARGE DIAGNOSIS  Diagnosis: Dyspepsia  Assessment and Plan of Treatment: Continue with medications as prescribed by your doctor.  Follow-up with your primary care physician within 1 week. Call for appointment.        SECONDARY DISCHARGE DIAGNOSES  Diagnosis: Pregnancy  Assessment and Plan of Treatment: Follow-up with your primary care physician and gynecologist within 1 week. Call for appointment.      Diagnosis: Other systemic lupus erythematosus with other organ involvement  Assessment and Plan of Treatment: Continue with prednisone as prescribed by your doctor.  Follow-up with your primary care physician and rheumatologist within 1 week. Call for appointment.      Diagnosis: Undifferentiated abdominal pain  Assessment and Plan of Treatment:

## 2024-10-22 ENCOUNTER — EMERGENCY (EMERGENCY)
Facility: HOSPITAL | Age: 44
LOS: 1 days | Discharge: HOME | End: 2024-10-22
Attending: EMERGENCY MEDICINE
Payer: COMMERCIAL

## 2024-10-22 VITALS
HEART RATE: 78 BPM | HEIGHT: 65 IN | SYSTOLIC BLOOD PRESSURE: 125 MMHG | RESPIRATION RATE: 19 BRPM | OXYGEN SATURATION: 97 % | WEIGHT: 100.97 LBS | TEMPERATURE: 98 F | DIASTOLIC BLOOD PRESSURE: 89 MMHG

## 2024-10-22 PROCEDURE — 99285 EMERGENCY DEPT VISIT HI MDM: CPT

## 2024-10-22 RX ORDER — HYDROMORPHONE HYDROCHLORIDE 1 MG/ML
0.5 INJECTION, SOLUTION INTRAMUSCULAR; INTRAVENOUS; SUBCUTANEOUS ONCE
Refills: 0 | Status: DISCONTINUED | OUTPATIENT
Start: 2024-10-22 | End: 2024-10-22

## 2024-10-22 RX ORDER — SODIUM CHLORIDE 0.9 % (FLUSH) 0.9 %
1000 SYRINGE (ML) INJECTION ONCE
Refills: 0 | Status: COMPLETED | OUTPATIENT
Start: 2024-10-22 | End: 2024-10-22

## 2024-10-22 RX ORDER — ONDANSETRON HCL/PF 4 MG/2 ML
4 VIAL (ML) INJECTION ONCE
Refills: 0 | Status: COMPLETED | OUTPATIENT
Start: 2024-10-22 | End: 2024-10-23

## 2024-10-22 NOTE — ED PROVIDER NOTE - PATIENT PORTAL LINK FT
You can access the FollowMyHealth Patient Portal offered by [facility] by registering at the following website: [email or web address]. By joining [employer]’s FollowMyHealth portal, you will also be able to view your health information using other applications (apps) compatible with our system.

## 2024-10-22 NOTE — ED PROVIDER NOTE - PHYSICAL EXAMINATION
Exam:   General: apperas in pain, lying on side  HENT: No pharyngeal erythema/exudate, external ear exam normal  Eyes: PEERL, EOMI  Lungs: CTA B/L, no wheezing, no rales, no ronchi  Chest: Normal Heart sounds, no murmurs, no rubs no gallops  Abdomen: Soft, no tenderness, no rigidity, no guarding, neg Rovsing's signs, neg tenderness at Mckburney's point  MSK: FROM of joints, no tenderness to palpation  Skin: No new rashes or lesions  Neuro: Alert and oriented x 3, power 5/5 x 4, CN II-XII GI, no facial asymmetry, no cerebellar findings

## 2024-10-22 NOTE — ED PROVIDER NOTE - OBJECTIVE STATEMENT
Attending Adrian:  45 y/o f PMH of SLE cb mesenteric vasculitis and AVN presenting to ed w/ cc of abd pain, worse in epigastric area but goes rhtouhgout abd, has been associated w/ multiple episodes of nbnb vomiting, no diarrhea, no hx of abd surg, feels like prior lupus flares, urually gets iv steroids for this, no fevers, no urinary symptoms,

## 2024-10-22 NOTE — ED PROVIDER NOTE - ATTENDING CONTRIBUTION TO CARE
MD Campbell:  patient seen and evaluated personally.   I agree with the History & Physical,  Impression & Plan other than what was detailed in my note.  MD Campbell  43 y/o f PMH of SLE cb mesenteric vasculitis and AVN presenting to ed w/ cc of abd pain, worse in epigastric area but goes rhtouhgout abd, has been associated w/ multiple episodes of nbnb vomiting, no diarrhea, no hx of abd surg, feels like prior lupus flares, urually gets iv steroids for this, no fevers, no urinary symptoms, afebrile vitals stable  appears in pain NC/AT,  conjunctiva non conjected, sclera anicteric, moist mucous membranes, neck supple, heart sounds, normal, no mrg, lungs cta b/l no wrr, abd soft non distended w/ pos gen tenderness worse in epigastric area, , no visual deformities of extremities, axox3, , normal mood and affect, diff broad at this time, possible flare vs, billiary pathology, vs sbo, vs infectious etiology, will get cbc, cmp, cta abd pelvis, sed/crp, pain meds re eval, pt may need admission for pain control, possible iv steroids depending on dx MD Campbell:  patient seen and evaluated personally.   I agree with the History & Physical,  Impression & Plan other than what was detailed in my note.  MD Campbell

## 2024-10-22 NOTE — ED PROVIDER NOTE - CLINICAL SUMMARY MEDICAL DECISION MAKING FREE TEXT BOX
Attending Masom:  afebrile vitals stable  appears in pain NC/AT,  conjunctiva non conjected, sclera anicteric, moist mucous membranes, neck supple, heart sounds, normal, no mrg, lungs cta b/l no wrr, abd soft non distended w/ pos gen tenderness worse in epigastric area, , no visual deformities of extremities, axox3, , normal mood and affect, diff broad at this time, possible flare vs, billiary pathology, vs sbo, vs infectious etiology, will get cbc, cmp, cta abd pelvis, sed/crp, pain meds re eval, pt may need admission for pain control, possible iv steroids depending on dx

## 2024-10-22 NOTE — ED PROVIDER NOTE - NSFOLLOWUPINSTRUCTIONS_ED_ALL_ED_FT
You were seenin the   - Return to the ED for any new, worsening, or concerning symptoms to you  Fever, Chills, that wont go away without tylenol, chest pain or shortness of breath  - Continue all prescribed medications    - William tylenol as directed as needed for pain    - Rest and keep yourself hydrated with fluids  -make it to your Rheumatology appointment

## 2024-10-23 VITALS
OXYGEN SATURATION: 98 % | SYSTOLIC BLOOD PRESSURE: 125 MMHG | DIASTOLIC BLOOD PRESSURE: 80 MMHG | TEMPERATURE: 98 F | RESPIRATION RATE: 18 BRPM | HEART RATE: 70 BPM

## 2024-10-23 LAB
ALBUMIN SERPL ELPH-MCNC: 2.8 G/DL — LOW (ref 3.3–5)
ALP SERPL-CCNC: 45 U/L — SIGNIFICANT CHANGE UP (ref 40–120)
ALT FLD-CCNC: 9 U/L — LOW (ref 10–45)
ANION GAP SERPL CALC-SCNC: 10 MMOL/L — SIGNIFICANT CHANGE UP (ref 5–17)
APPEARANCE UR: CLEAR — SIGNIFICANT CHANGE UP
AST SERPL-CCNC: 17 U/L — SIGNIFICANT CHANGE UP (ref 10–40)
BACTERIA # UR AUTO: NEGATIVE /HPF — SIGNIFICANT CHANGE UP
BASOPHILS # BLD AUTO: 0.02 K/UL — SIGNIFICANT CHANGE UP (ref 0–0.2)
BASOPHILS NFR BLD AUTO: 0.2 % — SIGNIFICANT CHANGE UP (ref 0–2)
BILIRUB SERPL-MCNC: 0.2 MG/DL — SIGNIFICANT CHANGE UP (ref 0.2–1.2)
BILIRUB UR-MCNC: NEGATIVE — SIGNIFICANT CHANGE UP
BUN SERPL-MCNC: 12 MG/DL — SIGNIFICANT CHANGE UP (ref 7–23)
CALCIUM SERPL-MCNC: 8.2 MG/DL — LOW (ref 8.4–10.5)
CAST: 4 /LPF — SIGNIFICANT CHANGE UP (ref 0–4)
CHLORIDE SERPL-SCNC: 110 MMOL/L — HIGH (ref 96–108)
CO2 SERPL-SCNC: 20 MMOL/L — LOW (ref 22–31)
COLOR SPEC: YELLOW — SIGNIFICANT CHANGE UP
CREAT SERPL-MCNC: 0.9 MG/DL — SIGNIFICANT CHANGE UP (ref 0.5–1.3)
CRP SERPL-MCNC: <3 MG/L — SIGNIFICANT CHANGE UP (ref 0–4)
DIFF PNL FLD: NEGATIVE — SIGNIFICANT CHANGE UP
EGFR: 82 ML/MIN/1.73M2 — SIGNIFICANT CHANGE UP
EOSINOPHIL # BLD AUTO: 0.02 K/UL — SIGNIFICANT CHANGE UP (ref 0–0.5)
EOSINOPHIL NFR BLD AUTO: 0.2 % — SIGNIFICANT CHANGE UP (ref 0–6)
ERYTHROCYTE [SEDIMENTATION RATE] IN BLOOD: 31 MM/HR — HIGH (ref 0–15)
GAS PNL BLDV: SIGNIFICANT CHANGE UP
GLUCOSE SERPL-MCNC: 124 MG/DL — HIGH (ref 70–99)
GLUCOSE UR QL: NEGATIVE MG/DL — SIGNIFICANT CHANGE UP
HCG SERPL-ACNC: <2 MIU/ML — SIGNIFICANT CHANGE UP
HCT VFR BLD CALC: 36.2 % — SIGNIFICANT CHANGE UP (ref 34.5–45)
HGB BLD-MCNC: 12.1 G/DL — SIGNIFICANT CHANGE UP (ref 11.5–15.5)
IMM GRANULOCYTES NFR BLD AUTO: 0.4 % — SIGNIFICANT CHANGE UP (ref 0–0.9)
KETONES UR-MCNC: NEGATIVE MG/DL — SIGNIFICANT CHANGE UP
LEUKOCYTE ESTERASE UR-ACNC: NEGATIVE — SIGNIFICANT CHANGE UP
LIDOCAIN IGE QN: 61 U/L — HIGH (ref 7–60)
LYMPHOCYTES # BLD AUTO: 2.75 K/UL — SIGNIFICANT CHANGE UP (ref 1–3.3)
LYMPHOCYTES # BLD AUTO: 23.9 % — SIGNIFICANT CHANGE UP (ref 13–44)
MCHC RBC-ENTMCNC: 28.5 PG — SIGNIFICANT CHANGE UP (ref 27–34)
MCHC RBC-ENTMCNC: 33.4 GM/DL — SIGNIFICANT CHANGE UP (ref 32–36)
MCV RBC AUTO: 85.4 FL — SIGNIFICANT CHANGE UP (ref 80–100)
MONOCYTES # BLD AUTO: 0.44 K/UL — SIGNIFICANT CHANGE UP (ref 0–0.9)
MONOCYTES NFR BLD AUTO: 3.8 % — SIGNIFICANT CHANGE UP (ref 2–14)
NEUTROPHILS # BLD AUTO: 8.21 K/UL — HIGH (ref 1.8–7.4)
NEUTROPHILS NFR BLD AUTO: 71.5 % — SIGNIFICANT CHANGE UP (ref 43–77)
NITRITE UR-MCNC: NEGATIVE — SIGNIFICANT CHANGE UP
NRBC # BLD: 0 /100 WBCS — SIGNIFICANT CHANGE UP (ref 0–0)
PH UR: 7.5 — SIGNIFICANT CHANGE UP (ref 5–8)
PLATELET # BLD AUTO: 221 K/UL — SIGNIFICANT CHANGE UP (ref 150–400)
POTASSIUM SERPL-MCNC: 4.1 MMOL/L — SIGNIFICANT CHANGE UP (ref 3.5–5.3)
POTASSIUM SERPL-SCNC: 4.1 MMOL/L — SIGNIFICANT CHANGE UP (ref 3.5–5.3)
PROT SERPL-MCNC: 5.5 G/DL — LOW (ref 6–8.3)
PROT UR-MCNC: 300 MG/DL
RBC # BLD: 4.24 M/UL — SIGNIFICANT CHANGE UP (ref 3.8–5.2)
RBC # FLD: 12.6 % — SIGNIFICANT CHANGE UP (ref 10.3–14.5)
RBC CASTS # UR COMP ASSIST: 0 /HPF — SIGNIFICANT CHANGE UP (ref 0–4)
SODIUM SERPL-SCNC: 140 MMOL/L — SIGNIFICANT CHANGE UP (ref 135–145)
SP GR SPEC: 1.03 — SIGNIFICANT CHANGE UP (ref 1–1.03)
SQUAMOUS # UR AUTO: 3 /HPF — SIGNIFICANT CHANGE UP (ref 0–5)
UROBILINOGEN FLD QL: 0.2 MG/DL — SIGNIFICANT CHANGE UP (ref 0.2–1)
WBC # BLD: 11.49 K/UL — HIGH (ref 3.8–10.5)
WBC # FLD AUTO: 11.49 K/UL — HIGH (ref 3.8–10.5)
WBC UR QL: 6 /HPF — HIGH (ref 0–5)

## 2024-10-23 PROCEDURE — 84295 ASSAY OF SERUM SODIUM: CPT

## 2024-10-23 PROCEDURE — 82435 ASSAY OF BLOOD CHLORIDE: CPT

## 2024-10-23 PROCEDURE — 83690 ASSAY OF LIPASE: CPT

## 2024-10-23 PROCEDURE — 82947 ASSAY GLUCOSE BLOOD QUANT: CPT

## 2024-10-23 PROCEDURE — 85652 RBC SED RATE AUTOMATED: CPT

## 2024-10-23 PROCEDURE — 84132 ASSAY OF SERUM POTASSIUM: CPT

## 2024-10-23 PROCEDURE — 80053 COMPREHEN METABOLIC PANEL: CPT

## 2024-10-23 PROCEDURE — 96375 TX/PRO/DX INJ NEW DRUG ADDON: CPT | Mod: XU

## 2024-10-23 PROCEDURE — 96376 TX/PRO/DX INJ SAME DRUG ADON: CPT | Mod: XU

## 2024-10-23 PROCEDURE — 85014 HEMATOCRIT: CPT

## 2024-10-23 PROCEDURE — 85025 COMPLETE CBC W/AUTO DIFF WBC: CPT

## 2024-10-23 PROCEDURE — 99285 EMERGENCY DEPT VISIT HI MDM: CPT | Mod: 25

## 2024-10-23 PROCEDURE — 74174 CTA ABD&PLVS W/CONTRAST: CPT | Mod: 26,MC

## 2024-10-23 PROCEDURE — 86140 C-REACTIVE PROTEIN: CPT

## 2024-10-23 PROCEDURE — 82330 ASSAY OF CALCIUM: CPT

## 2024-10-23 PROCEDURE — 85018 HEMOGLOBIN: CPT

## 2024-10-23 PROCEDURE — 83605 ASSAY OF LACTIC ACID: CPT

## 2024-10-23 PROCEDURE — 74174 CTA ABD&PLVS W/CONTRAST: CPT | Mod: MC

## 2024-10-23 PROCEDURE — 93005 ELECTROCARDIOGRAM TRACING: CPT

## 2024-10-23 PROCEDURE — 82803 BLOOD GASES ANY COMBINATION: CPT

## 2024-10-23 PROCEDURE — 81001 URINALYSIS AUTO W/SCOPE: CPT

## 2024-10-23 PROCEDURE — 84702 CHORIONIC GONADOTROPIN TEST: CPT

## 2024-10-23 PROCEDURE — 96374 THER/PROPH/DIAG INJ IV PUSH: CPT | Mod: XU

## 2024-10-23 RX ORDER — METHYLPREDNISOLONE ACETATE 80 MG/ML
125 INJECTION, SUSPENSION INTRA-ARTICULAR; INTRALESIONAL; INTRAMUSCULAR; SOFT TISSUE ONCE
Refills: 0 | Status: COMPLETED | OUTPATIENT
Start: 2024-10-23 | End: 2024-10-23

## 2024-10-23 RX ORDER — ONDANSETRON HCL/PF 4 MG/2 ML
4 VIAL (ML) INJECTION ONCE
Refills: 0 | Status: COMPLETED | OUTPATIENT
Start: 2024-10-23 | End: 2024-10-23

## 2024-10-23 RX ORDER — HYDROMORPHONE HYDROCHLORIDE 1 MG/ML
0.5 INJECTION, SOLUTION INTRAMUSCULAR; INTRAVENOUS; SUBCUTANEOUS ONCE
Refills: 0 | Status: DISCONTINUED | OUTPATIENT
Start: 2024-10-23 | End: 2024-10-23

## 2024-10-23 RX ADMIN — HYDROMORPHONE HYDROCHLORIDE 0.5 MILLIGRAM(S): 1 INJECTION, SOLUTION INTRAMUSCULAR; INTRAVENOUS; SUBCUTANEOUS at 04:05

## 2024-10-23 RX ADMIN — Medication 1000 MILLILITER(S): at 00:18

## 2024-10-23 RX ADMIN — HYDROMORPHONE HYDROCHLORIDE 0.5 MILLIGRAM(S): 1 INJECTION, SOLUTION INTRAMUSCULAR; INTRAVENOUS; SUBCUTANEOUS at 02:51

## 2024-10-23 RX ADMIN — HYDROMORPHONE HYDROCHLORIDE 0.5 MILLIGRAM(S): 1 INJECTION, SOLUTION INTRAMUSCULAR; INTRAVENOUS; SUBCUTANEOUS at 00:18

## 2024-10-23 RX ADMIN — Medication 4 MILLIGRAM(S): at 02:28

## 2024-10-23 RX ADMIN — METHYLPREDNISOLONE ACETATE 125 MILLIGRAM(S): 80 INJECTION, SUSPENSION INTRA-ARTICULAR; INTRALESIONAL; INTRAMUSCULAR; SOFT TISSUE at 03:31

## 2024-10-23 RX ADMIN — Medication 4 MILLIGRAM(S): at 00:18

## 2024-10-23 RX ADMIN — HYDROMORPHONE HYDROCHLORIDE 0.5 MILLIGRAM(S): 1 INJECTION, SOLUTION INTRAMUSCULAR; INTRAVENOUS; SUBCUTANEOUS at 01:15

## 2024-10-23 NOTE — ED ADULT NURSE NOTE - NSFALLUNIVINTERV_ED_ALL_ED
Bed/Stretcher in lowest position, wheels locked, appropriate side rails in place/Call bell, personal items and telephone in reach/Instruct patient to call for assistance before getting out of bed/chair/stretcher/Non-slip footwear applied when patient is off stretcher/[place] to call system/Physically safe environment - no spills, clutter or unnecessary equipment/Purposeful proactive rounding/Room/bathroom lighting operational, light cord in reach

## 2024-10-23 NOTE — ED ADULT NURSE NOTE - EXTENSIONS OF SELF_ADULT
Problem: Physical Therapy  Goal: Physical Therapy Goal  Description: Goals to be met by: 2024     Patient will increase functional independence with mobility by performin. Supine to sit with MInimal Assistance  2. Sit to stand transfer with Moderate Assistance  3. Bed to chair transfer with Moderate Assistance using Rolling Walker  4. Gait  x 10 feet with Moderate Assistance using Rolling Walker.   5. Lower extremity exercise program x20 reps   Outcome: Ongoing, Progressing   Therapeutic activity to improve functional mobility : bed mobility , transfers, gait with rw and assistance for safety, LE exercises.    None

## 2024-10-23 NOTE — ED ADULT NURSE NOTE - NS ED NOTE ABUSE SUSPICION NEGLECT YN
-Blunt trauma with paint can falling on L dorsal foot 9/1/20  -XRs done 9/3/20 and 9/6/20 both unremarkable  -Patient complaining of persistent pain despite using Tylenol, boot and crutches  -No point tenderness on exam, no swelling, but patient reports pain at rest and with use  -Low suspicion for occult fracture, but patient at risk for bone mineral density disease 2/2 CKD  -If no improvement in 2 weeks, will make referral to Orthopedics and consider MRI  -Bengay sent to pharmacy, advised on proper application of ice, tylenol dosing for analgesia  -Discussed she may change crutches to cane if she wishes, WBAT   -However, encouraged mobility to avoid stiffness from lack of use No

## 2024-10-23 NOTE — ED ADULT NURSE NOTE - OBJECTIVE STATEMENT
44Y F AXO4 PMH of Lupus presented to the ED from home c/o nausea and multiple episodes of NBNB emesis since 9 p.m. tonight. Upon arrival to the ED, the pt is well appearing, has bilateral even and unlabored chest rise, and ambulatory with steady gait, Upon assessment, pt has even and bilateral peripheral pulses, ROM, PERRLA, gross neuro intact, and soft, non-distended abdomen, tenderness noted upon palpation of epigastric area. Pt denies fevers, chills, chest pain, SOB, diarrhea, recent travel, sick contacts, urinary symptoms, and black or bloody stools. IV access obtained, bed in lowest position, side rails up. Comfort and safety provided, pt's  at bedside.

## 2024-10-29 NOTE — ED ADULT NURSE NOTE - CAS DISCH BELONGINGS RETURNED
Detail Level: Simple
Otc Regimen: 
Not applicable

## 2025-06-30 NOTE — PATIENT PROFILE ADULT - ANY IMPAIRED UPPER EXTREMITY FUNCTION WITHIN A WEEK PRIOR TO ADMISSION RELATED TO?
-We reviewed the etiology of warts in detail with the family. Discussed that this is a viral infection of the skin. Warts are difficult to eradicate as they occur in areas of relative immune incompetent skin. Treatments are aimed at creating local irritation to the skin, in order to activate the body's immune system to resolve the viral infection.   -Treatment options discussed with the family including cryotherapy, topical therapies.  -Today elect to do the following:   -Cryotherapy to the involved areas.  Reviewed SE of cryotherapy including pain, blistering, and potential scarring/dyspigmentation.   -Start OTC salicylic acid 40% to involved areas once daily.  Instructions provided for use.    no